# Patient Record
Sex: FEMALE | Race: WHITE | Employment: STUDENT | URBAN - METROPOLITAN AREA
[De-identification: names, ages, dates, MRNs, and addresses within clinical notes are randomized per-mention and may not be internally consistent; named-entity substitution may affect disease eponyms.]

---

## 2019-04-05 ENCOUNTER — TELEPHONE (OUTPATIENT)
Dept: DERMATOLOGY | Facility: CLINIC | Age: 19
End: 2019-04-05

## 2019-04-05 NOTE — TELEPHONE ENCOUNTER
Dermatology Pre-visit Call:    Reason for visit : Acne        Patient Reminders Given:  --Please, make sure you bring an updated list of your medications.   --Plan on being in our facility for approximately one hour, this includes the registration process, office visit, education and check-out process.  If you are having a procedure, more time may be required.     --If you are having a procedure, please, present 15 minutes early.  --Location reviewed.   --If you need to cancel or reschedule, call XXXX  --We look forward to seeing you in Dermatology Clinic.

## 2019-04-13 ENCOUNTER — OFFICE VISIT (OUTPATIENT)
Dept: DERMATOLOGY | Facility: CLINIC | Age: 19
End: 2019-04-13
Payer: COMMERCIAL

## 2019-04-13 VITALS — DIASTOLIC BLOOD PRESSURE: 58 MMHG | SYSTOLIC BLOOD PRESSURE: 103 MMHG

## 2019-04-13 DIAGNOSIS — L70.0 ACNE VULGARIS: Primary | ICD-10-CM

## 2019-04-13 RX ORDER — SPIRONOLACTONE 25 MG/1
50 TABLET ORAL 2 TIMES DAILY
Qty: 60 TABLET | Refills: 3 | Status: SHIPPED | OUTPATIENT
Start: 2019-04-13 | End: 2019-04-13

## 2019-04-13 RX ORDER — SPIRONOLACTONE 25 MG/1
50 TABLET ORAL 2 TIMES DAILY
Qty: 60 TABLET | Refills: 3 | Status: SHIPPED | OUTPATIENT
Start: 2019-04-13 | End: 2019-05-16 | Stop reason: DRUGHIGH

## 2019-04-13 RX ORDER — ADAPALENE 0.1 G/100G
CREAM TOPICAL AT BEDTIME
COMMUNITY
End: 2020-01-20

## 2019-04-13 RX ORDER — DOXYCYCLINE 100 MG/1
100 CAPSULE ORAL 2 TIMES DAILY
Qty: 60 CAPSULE | Refills: 3 | Status: SHIPPED | OUTPATIENT
Start: 2019-04-13 | End: 2019-05-16

## 2019-04-13 RX ORDER — SULFACETAMIDE SODIUM 100 MG/ML
LOTION TOPICAL
COMMUNITY
End: 2020-01-20

## 2019-04-13 ASSESSMENT — PAIN SCALES - GENERAL: PAINLEVEL: MILD PAIN (2)

## 2019-04-13 NOTE — LETTER
4/13/2019       RE: Massiel Salvador  [facility]  [address]  [facility] [address]     Dear Colleague,    Thank you for referring your patient, Massiel Salvador, to the [facility] DERMATOLOGY at [facility]. Please see a copy of my visit note below.    [facility] Dermatology Note      Dermatology Problem List:  1. Acne Vulgaris - Start doxycyline 100 mg po bid, spironolactone 50mg po BID, Continue adapalene 0.1% cream and sulfacetamide sodium 10% lotion    Encounter Date: Apr 13, 2019    CC:  Chief Complaint   Patient presents with     Acne     Massiel is here today to be seen for acne.          History of Present Illness:  Ms. Massiel Salvador is a 18 year old female who is new to the dermatology clinic present for an evaluation for acne vulgaris. Patient has been dealing with her acne her whole life. It comes in waves of good and bad. These past couple months she noticed her her acne flare up. She mostly had acne on her right cheek and chin. But now is on both sided and is painful. She does not get body acne.  She has seen a dermatologist in the past and was put on adapalene 0.1% cream and sulfacetamide sodium 10% lotion. Patient's form of contraception is Mirena IUD, and was placed in August. She does get irregular menstrual periods or no menstrual period due to Mirena. She tired combined and mini pill as her OCP in the past but she noticed side effects while on the medication and thus wants to avoid them.  Has tried doxycyline and minocycline in the past, but did notice nausea. Patient does not have a family history of breast cancer, except for paternal grandmother. The patient denies painful, itching, tingling or bleeding lesions unless otherwise noted.    Past Medical History:   There is no problem list on file for this patient.    History reviewed. No pertinent past medical history.  History reviewed. No pertinent surgical history.    Social History:   reports  that she has never smoked. She has never used smokeless tobacco.   - student studying international business at the [employer]     Family History:  Family History   Problem Relation Age of Onset     Melanoma No family hx of      Skin Cancer No family hx of        Medications:  Current Outpatient Medications   Medication Sig Dispense Refill     adapalene (DIFFERIN) 0.1 % external cream Apply topically At Bedtime       sulfacetamide sodium, Acne, 10 % lotion          No Known Allergies    Review of Systems:  -Constitutional: The patient denies fatigue, fevers, chills, unintended weight loss, and night sweats.  -HEENT: Patient denies nonhealing oral sores.  -Skin: As above in HPI. No additional skin concerns.  -GI: The patient denies nausea, vomiting, diarrhea or abdominal pain.  -: No changes in menstrual cycle, no breast tenderness, no change in urination  -CVS: no palpitations, dizziness    Physical exam:  Vitals: There were no vitals taken for this visit.  GEN: This is a well developed, well-nourished female in no acute distress, in a pleasant mood.    SKIN: Acne exam, which includes the face, neck, upper central chest, and upper central back was performed.  -There are superifical acneiform papules with intermixed open and closed comedones on the bilateral cheeks and chin in a hormonal distribution.   -No other lesions of concern on areas examined.       Impression/Plan:  1. Acne vulgaris    Educatd on the etiology     Start doxycyline 100 mg po bid, will plan to discontinue this at next visit in the hope that her spironolactone will be having a positive effect    Start spironolactone 50mg po BID for a total of 100mg daily  Side effects of spironolactone discussed including postural hypotension, increased frequency of urination, breast tenderness, menstrual spotting, cardiac arrythmias and the need for contraception due to fetal feminization.    Baseline blood pressure obtained. Discussed with patient that medication  will need to be stopped if pregnancy is desired.   Start Spironolactone 50mg daily which may be increased to twice daily after one week as tolerated.    There is no family history or personal history of breast malignancy known to patient.    Continue adapalene 0.1% cream    Continue sulfacetamide sodium 10% lotion    Sun precaution was advised including the use of sun screens of SPF 30 or higher, sun protective clothing, and avoidance of tanning beds.    CC Dr. Caceres on close of this encounter.  Follow-up in 4 months, earlier for new or changing lesions.       Staff Involved:  Staff/Scribe    Scribe Disclosure:  I, Ellen Parrish, am serving as a scribe to document services personally performed by Ryann Weber PA-C, based on data collection and the provider's statements to me.     Provider Disclosure:   The documentation recorded by the scribe accurately reflects the services I personally performed and the decisions made by me.    All risks, benefits and alternatives were discussed with patient.  Patient is in agreement and understands the assessment and plan.  All questions were answered.    Ryann Weber PA-C

## 2019-04-13 NOTE — PROGRESS NOTES
[facility] Dermatology Note      Dermatology Problem List:  1. Acne Vulgaris - Start doxycyline 100 mg po bid, spironolactone 50mg po BID, Continue adapalene 0.1% cream and sulfacetamide sodium 10% lotion    Encounter Date: Apr 13, 2019    CC:  Chief Complaint   Patient presents with     Acne     Massiel is here today to be seen for acne.          History of Present Illness:  Ms. Massiel Salvador is a 18 year old female who is new to the dermatology clinic present for an evaluation for acne vulgaris. Patient has been dealing with her acne her whole life. It comes in waves of good and bad. These past couple months she noticed her her acne flare up. She mostly had acne on her right cheek and chin. But now is on both sided and is painful. She does not get body acne.  She has seen a dermatologist in the past and was put on adapalene 0.1% cream and sulfacetamide sodium 10% lotion. Patient's form of contraception is Mirena IUD, and was placed in August. She does get irregular menstrual periods or no menstrual period due to Mirena. She tired combined and mini pill as her OCP in the past but she noticed side effects while on the medication and thus wants to avoid them.  Has tried doxycyline and minocycline in the past, but did notice nausea. Patient does not have a family history of breast cancer, except for paternal grandmother. The patient denies painful, itching, tingling or bleeding lesions unless otherwise noted.    Past Medical History:   There is no problem list on file for this patient.    History reviewed. No pertinent past medical history.  History reviewed. No pertinent surgical history.    Social History:   reports that she has never smoked. She has never used smokeless tobacco.   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  -HEENT: Patient denies nonhealing oral sores.  -Skin: As above in HPI. No additional skin concerns.  -GI: The patient denies nausea, vomiting, diarrhea or abdominal pain.  -: No changes in menstrual cycle, no breast tenderness, no change in urination  -CVS: no palpitations, dizziness    Physical exam:  Vitals: There were no vitals taken for this visit.  GEN: This is a well developed, well-nourished female in no acute distress, in a pleasant mood.    SKIN: Acne exam, which includes the face, neck, upper central chest, and upper central back was performed.  -There are superifical acneiform papules with intermixed open and closed comedones on the bilateral cheeks and chin in a hormonal distribution.   -No other lesions of concern on areas examined.       Impression/Plan:  1. Acne vulgaris    Educatd on the etiology     Start doxycyline 100 mg po bid, will plan to discontinue this at next visit in the hope that her spironolactone will be having a positive effect    Start spironolactone 50mg po BID for a total of 100mg daily  Side effects of spironolactone discussed including postural hypotension, increased frequency of urination, breast tenderness, menstrual spotting, cardiac arrythmias and the need for contraception due to fetal feminization.    Baseline blood pressure obtained. Discussed with patient that medication will need to be stopped if pregnancy is desired.   Start Spironolactone 50mg daily which may be increased to twice daily after one week as tolerated.    There is no family history or personal history of breast malignancy known to patient.    Continue adapalene 0.1%  cream    Continue sulfacetamide sodium 10% lotion    Sun precaution was advised including the use of sun screens of SPF 30 or higher, sun protective clothing, and avoidance of tanning beds.    CC Dr. Caceres on close of this encounter.  Follow-up in 4 months, earlier for new or changing lesions.       Staff Involved:  Staff/Scribe    Scribe Disclosure:  I, Ellen Parrish, am serving as a scribe to document services personally performed by Ryann Weber PA-C, based on data collection and the provider's statements to me.     Provider Disclosure:   The documentation recorded by the scribe accurately reflects the services I personally performed and the decisions made by me.    All risks, benefits and alternatives were discussed with patient.  Patient is in agreement and understands the assessment and plan.  All questions were answered.    Ryann Weber PA-C  [facility]: Phone: [phone], Fax: [phone]

## 2019-04-13 NOTE — NURSING NOTE
Dermatology Rooming Note    Massiel Salvador's goals for this visit include:   Chief Complaint   Patient presents with     Acne     Massiel is here today to be seen for acne.      KATHRINE Majano

## 2019-04-13 NOTE — PATIENT INSTRUCTIONS
Doxycycline     1.Doxycycline treats and prevents infections and may be used to treat acne.   2.Do not use it if you had an allergic reaction to doxycycline or another tetracycline antibiotic, or if you are pregnant or breastfeeding.  3. If you miss a dose, take a dose as soon as you remember. If it is almost time for your next dose, wait until then and take a regular dose. Do not take extra medicine to make up for a missed dose.   4 . Some foods and medicines can affect the medication. Tell your doctor if you are using any of the following: bismuth subsalicylate, isotretinoin, acitretin, medications that contain aluminum, calcium, or iron (such an antacid or vitamin supplement)  5. This medicine may cause birth defects if being used during pregnancy.  Use two forms of birth control to keep from getting pregnant.   6. Tell your doctor if you had stomach surgery or if you have a history of yeast infections.   7. This medicine may cause the following problems (stop the medication if you experience these symptoms and call your doctor):  Permanent change in tooth color (in children younger than 8 years old)   Increased pressure inside the head   Yeast infection   Diarrhea    This medicine may make your skin more sun sensitive and increase sun burns. Wear sunscreen and do not tan.     Allergic reaction with itching, hives, facial swelling, throat swelling, chest tightness, trouble breathing     Blistering, peeling, red skin rash     Burning, pain, or irritation in your upper stomach or throat     Joint pain, fever, rash, and unusual tiredness or weakness     Severe headache, dizziness, or vision changes  8. Call your doctor if your symptoms worsen or do not improve  Who should I call with questions?    [facility]: [phone]     [facility]: [phone]    For urgent needs outside of business hours call the [facility] at [phone] and ask for the  dermatology resident on call

## 2019-05-16 ENCOUNTER — TELEPHONE (OUTPATIENT)
Dept: DERMATOLOGY | Facility: CLINIC | Age: 19
End: 2019-05-16

## 2019-05-16 DIAGNOSIS — L70.0 ACNE VULGARIS: ICD-10-CM

## 2019-05-16 RX ORDER — SPIRONOLACTONE 50 MG/1
50 TABLET, FILM COATED ORAL 2 TIMES DAILY
Qty: 180 TABLET | Refills: 0 | Status: SHIPPED | OUTPATIENT
Start: 2019-05-16 | End: 2019-10-15

## 2019-05-16 RX ORDER — DOXYCYCLINE 100 MG/1
100 CAPSULE ORAL 2 TIMES DAILY
Qty: 180 CAPSULE | Refills: 0 | Status: SHIPPED | OUTPATIENT
Start: 2019-05-16 | End: 2019-08-09 | Stop reason: SINTOL

## 2019-05-16 NOTE — TELEPHONE ENCOUNTER
[facility]    Phone Message    May a detailed message be left on voicemail: yes    Reason for Call: Medication Question or concern regarding medication   Prescription Clarification  Name of Medication: Doxycycline, and Spironolactone  Prescribing Provider: FATOU   Pharmacy: [facility], [address] [place] [address]  PH#: [phone]   What on the order needs clarification? Pt will be leaving the country for 3 months THIS SUNDAY. Pt is requesting for these 2 rx to be sent to the pharmacy asap.     Action Taken: Message routed to:  [facility] ([facility] derm

## 2019-05-16 NOTE — TELEPHONE ENCOUNTER
Received message that pt needed refill of doxy and spironolactone as she will be leaving country for 3 months. Reviewed noted from 4/13/19 visit with Ryann Weber and based on that note, sent Rx for 90-day supply of doxycycline 100mg BID and spironolactone 50mg BID.     Attempted to call patient to ask about which pharmacy she would like it sent to as previous prescriptions sent to [facility] in [facility] and this request had [facility] in [place] tagged. Was unable to reach her, so sent prescriptions electronically to [facility].     Nickolas Samuels MD  PGY4 Dermatology Resident  Pager: [phone]

## 2019-08-09 ENCOUNTER — OFFICE VISIT (OUTPATIENT)
Dept: DERMATOLOGY | Facility: CLINIC | Age: 19
End: 2019-08-09
Payer: COMMERCIAL

## 2019-08-09 VITALS — SYSTOLIC BLOOD PRESSURE: 106 MMHG | DIASTOLIC BLOOD PRESSURE: 67 MMHG

## 2019-08-09 DIAGNOSIS — L70.0 ACNE VULGARIS: ICD-10-CM

## 2019-08-09 DIAGNOSIS — L81.0 POST-INFLAMMATORY HYPERPIGMENTATION: ICD-10-CM

## 2019-08-09 DIAGNOSIS — Z79.899 ON ISOTRETINOIN THERAPY: Primary | ICD-10-CM

## 2019-08-09 ASSESSMENT — PAIN SCALES - GENERAL: PAINLEVEL: NO PAIN (0)

## 2019-08-09 NOTE — LETTER
"8/9/2019       RE: Massiel Salvador  [address]  [place]  [place] [address]     Dear Colleague,    Thank you for referring your patient, Massiel Salvador, to the [facility] DERMATOLOGY at [facility]. Please see a copy of my visit note below.    [facility] Dermatology Note      Dermatology Problem List:  1. Acne Vulgaris - Plan to start isotretinoin pending approval from her plastic surgeon, will obtain first urine pregnancy test no 8/26/19 if isotretinoin is approved. May also continue on spironolactone concurrently.  -Current tx: spironolactone 50mg po BID, Continue adapalene 0.1% cream and sulfacetamide sodium 10% lotion  -Previous tx: doxycycline 100mg po BID    Encounter Date: Aug 9, 2019    CC:  Chief Complaint   Patient presents with     Acne     Massiel is here today for an acne follow up- Massiel states \"it's better\".          History of Present Illness:  Ms. Massiel Salvador is a 18 year old female who presents as a follow-up for acne. The patient was last seen on 04/13/2019 when doxycycline 100mg po BID and spironolactone 50mg po BID was started for acne vulgaris and adapalene 0.1% cream and sulfacetamide sodium 10% lotion was continued for acne vulgaris.    At today's visit, the patient notes her acne is improved overall. She states that doxycycline is upsetting her stomach, even with probiotics and that she takes spironolactone at night due to dizziness. She states she would like to discuss accutane. According to the patient her acne is cyclical, and that her skin will get worse in a couple months. She is currently on an IUD. The patient notes a history of anxiety and depression that she has dealt with her whole life. She visits a therapist regularly and has a \"good support system\" and takes hydroxyzine as needed, but is otherwise stable and does not really use oral medication for it. The patient requests to stop doxycycline due to her upset stomach. She states she " will be getting a rhinoplasty next week and notes the healing time will be six weeks. Denies menstrual irregularities, breast tenderness, or excessive urination.     Past Medical History:   There is no problem list on file for this patient.    No past medical history on file.  No past surgical history on file.    Social History:   reports that she has never smoked. She has never used smokeless tobacco.    Family History:  Family History   Problem Relation Age of Onset     Melanoma No family hx of      Skin Cancer No family hx of        Medications:  Current Outpatient Medications   Medication Sig Dispense Refill     adapalene (DIFFERIN) 0.1 % external cream Apply topically At Bedtime       doxycycline monohydrate (MONODOX) 100 MG capsule Take 1 capsule (100 mg) by mouth 2 times daily 180 capsule 0     spironolactone (ALDACTONE) 50 MG tablet Take 1 tablet (50 mg) by mouth 2 times daily 180 tablet 0     sulfacetamide sodium, Acne, 10 % lotion          No Known Allergies    Review of Systems:  -GI: The patient denies nausea, vomiting, diarrhea or abdominal pain.  -: No changes in menstrual cycle, no breast tenderness, no change in urination  -CVS: no palpitations, dizziness  -Constitutional: The patient denies fatigue, fevers, chills, unintended weight loss, and night sweats.  -HEENT: Patient denies nonhealing oral sores.  -Skin: As above in HPI. No additional skin concerns.    Physical exam:  Vitals: /67 (BP Location: Right arm, Patient Position: Sitting, Cuff Size: Adult Regular)   GEN: This is a well developed, well-nourished female in no acute distress, in a pleasant mood.    SKIN: Acne exam, which includes the face, neck, upper central chest, and upper central back was performed.  -There are superifical acneiform papules with intermixed open and closed comedones on the cheeks and chin.   -Moderate improvement from last visit  -Scattered erythematous macules on the lower face  -No other lesions of concern  on areas examined.       Impression/Plan:  1. Acne vulgaris with PIH  Will start isotretinoin 40mg in approximately 7 weeks pending approval from her plastic surgeon. Goal dose is 150-220mg/kg for this 65.90kg patient.   If isotretinoin is not approved by the patient's surgeon, will start isotretinoin in approximately 3 months post surgery   Method of contraception includes: IUD and male condoms  Discussion of the risks and side effects of isotretinoin including but not limited to mucocutaneous dryness, arthralgias, myalgias, depression, suicidal ideation, headache, blurred vision, increase in liver function test and increase in lipids. The iPledge program brochure was provided and the contents discussed with the patient. The patient was counseled that they cannot give blood while on isotretinoin. Advised against tattoos and waxing. No personal or family history of inflammatory bowel disease or hypertriglyceridemia known to patient. Reviewed need to avoid alcohol on medication. The iPledge program consent was obtained. Patient counseled that if they wear contacts, the eyes may become too dry to tolerate. Recommend follow up with eye doctor if this occurs.    Discussed need for sun protection, at least SPF 30+.  Urine pregnancy test to be obtained on August 26th.   Contraception will include: IUD and male latex condoms.  Next month: baseline labs including qualitative hCG, CBC, GGT, BUN/Cr, fasting lipids and AST/ALT will be obtained.   Patient to be registered with iPledge on August 26th Patient's iPledge # is [id number].   The patient will stop all other acne medications in approximately 7 weeks. Will discontinue doxycycline now as patient was getting some  side effects.  Total dose: 0mg/kg  Will consider continuing spironolactone over the course of isotretinoin treatment       CC Dr. Caceres on close of this encounter.  Follow-up in 7 weeks, earlier for new or changing lesions.       Staff  Involved:  Staff/Scribe    Scribe Disclosure:  I, Eron Alvarez, am serving as a scribe to document services personally performed by Ryann Weber PA-C, based on data collection and the provider's statements to me.     Provider Disclosure:   The documentation recorded by the scribe accurately reflects the services I personally performed and the decisions made by me.    All risks, benefits and alternatives were discussed with patient.  Patient is in agreement and understands the assessment and plan.  All questions were answered.    Ryann Weber PA-C  [facility]: Phone: [phone], Fax: [phone]

## 2019-08-09 NOTE — NURSING NOTE
"Dermatology Rooming Note    Massiel Salvador's goals for this visit include:   Chief Complaint   Patient presents with     Acne     Massiel is here today for an acne follow up- Massiel states \"it's better\".      Zaida Cosby, KATHRINE     "

## 2019-08-09 NOTE — PROGRESS NOTES
"[facility] Dermatology Note      Dermatology Problem List:  1. Acne Vulgaris - Plan to start isotretinoin pending approval from her plastic surgeon, will obtain first urine pregnancy test no 8/26/19 if isotretinoin is approved. May also continue on spironolactone concurrently.  -Current tx: spironolactone 50mg po BID, Continue adapalene 0.1% cream and sulfacetamide sodium 10% lotion  -Previous tx: doxycycline 100mg po BID    Encounter Date: Aug 9, 2019    CC:  Chief Complaint   Patient presents with     Acne     Massiel is here today for an acne follow up- Massiel states \"it's better\".          History of Present Illness:  Ms. Massiel Salvador is a 18 year old female who presents as a follow-up for acne. The patient was last seen on 04/13/2019 when doxycycline 100mg po BID and spironolactone 50mg po BID was started for acne vulgaris and adapalene 0.1% cream and sulfacetamide sodium 10% lotion was continued for acne vulgaris.    At today's visit, the patient notes her acne is improved overall. She states that doxycycline is upsetting her stomach, even with probiotics and that she takes spironolactone at night due to dizziness. She states she would like to discuss accutane. According to the patient her acne is cyclical, and that her skin will get worse in a couple months. She is currently on an IUD. The patient notes a history of anxiety and depression that she has dealt with her whole life. She visits a therapist regularly and has a \"good support system\" and takes hydroxyzine as needed, but is otherwise stable and does not really use oral medication for it. The patient requests to stop doxycycline due to her upset stomach. She states she will be getting a rhinoplasty next week and notes the healing time will be six weeks. Denies menstrual irregularities, breast tenderness, or excessive urination.     Past Medical History:   There is no problem list on file for this patient.    No past medical history on " file.  No past surgical history on file.    Social History:   reports that she has never smoked. She has never used smokeless tobacco.    Family History:  Family History   Problem Relation Age of Onset     Melanoma No family hx of      Skin Cancer No family hx of        Medications:  Current Outpatient Medications   Medication Sig Dispense Refill     adapalene (DIFFERIN) 0.1 % external cream Apply topically At Bedtime       doxycycline monohydrate (MONODOX) 100 MG capsule Take 1 capsule (100 mg) by mouth 2 times daily 180 capsule 0     spironolactone (ALDACTONE) 50 MG tablet Take 1 tablet (50 mg) by mouth 2 times daily 180 tablet 0     sulfacetamide sodium, Acne, 10 % lotion          No Known Allergies    Review of Systems:  -GI: The patient denies nausea, vomiting, diarrhea or abdominal pain.  -: No changes in menstrual cycle, no breast tenderness, no change in urination  -CVS: no palpitations, dizziness  -Constitutional: The patient denies fatigue, fevers, chills, unintended weight loss, and night sweats.  -HEENT: Patient denies nonhealing oral sores.  -Skin: As above in HPI. No additional skin concerns.    Physical exam:  Vitals: /67 (BP Location: Right arm, Patient Position: Sitting, Cuff Size: Adult Regular)   GEN: This is a well developed, well-nourished female in no acute distress, in a pleasant mood.    SKIN: Acne exam, which includes the face, neck, upper central chest, and upper central back was performed.  -There are superifical acneiform papules with intermixed open and closed comedones on the cheeks and chin.   -Moderate improvement from last visit  -Scattered erythematous macules on the lower face  -No other lesions of concern on areas examined.       Impression/Plan:  1. Acne vulgaris with PIH  Will start isotretinoin 40mg in approximately 7 weeks pending approval from her plastic surgeon. Goal dose is 150-220mg/kg for this 65.90kg patient.   If isotretinoin is not approved by the patient's  surgeon, will start isotretinoin in approximately 3 months post surgery   Method of contraception includes: IUD and male condoms  Discussion of the risks and side effects of isotretinoin including but not limited to mucocutaneous dryness, arthralgias, myalgias, depression, suicidal ideation, headache, blurred vision, increase in liver function test and increase in lipids. The iPledge program brochure was provided and the contents discussed with the patient. The patient was counseled that they cannot give blood while on isotretinoin. Advised against tattoos and waxing. No personal or family history of inflammatory bowel disease or hypertriglyceridemia known to patient. Reviewed need to avoid alcohol on medication. The iPledge program consent was obtained. Patient counseled that if they wear contacts, the eyes may become too dry to tolerate. Recommend follow up with eye doctor if this occurs.    Discussed need for sun protection, at least SPF 30+.  Urine pregnancy test to be obtained on August 26th.   Contraception will include: IUD and male latex condoms.  Next month: baseline labs including qualitative hCG, CBC, GGT, BUN/Cr, fasting lipids and AST/ALT will be obtained.   Patient to be registered with iPledge on August 26th Patient's iPledge # is [id number].   The patient will stop all other acne medications in approximately 7 weeks. Will discontinue doxycycline now as patient was getting some  side effects.  Total dose: 0mg/kg  Will consider continuing spironolactone over the course of isotretinoin treatment       CC Dr. Caceres on close of this encounter.  Follow-up in 7 weeks, earlier for new or changing lesions.       Staff Involved:  Staff/Scribe    Scribe Disclosure:  Eron DUARTE, am serving as a scribe to document services personally performed by Ryann Weber PA-C, based on data collection and the provider's statements to me.     Provider Disclosure:   The documentation recorded by the scribe accurately  reflects the services I personally performed and the decisions made by me.    All risks, benefits and alternatives were discussed with patient.  Patient is in agreement and understands the assessment and plan.  All questions were answered.    Ryann Weber PA-C  [facility]: Phone: [phone], Fax: [phone]

## 2019-08-27 ENCOUNTER — TELEPHONE (OUTPATIENT)
Dept: DERMATOLOGY | Facility: CLINIC | Age: 19
End: 2019-08-27

## 2019-08-27 NOTE — TELEPHONE ENCOUNTER
Called pt to see if she had her pregnancy test done for accutane. Massiel states that she has not had a chance to talk with her primary care provider yet and will call us to let us know when she will be coming back to have her test done.     Zaiad Cosby, A

## 2019-10-03 ENCOUNTER — TELEPHONE (OUTPATIENT)
Dept: DERMATOLOGY | Facility: CLINIC | Age: 19
End: 2019-10-03

## 2019-10-03 NOTE — TELEPHONE ENCOUNTER
Massiel called to get a refill on her Spironolactone but had no additional refills after looking further into the chart it looks like you were going to consider continuing spironolactone over the course of isotretinoin treatment but since she hasn't started Accutane yet I wasn't sure. Massiel would like a appointment to discuss further treatment

## 2019-10-07 NOTE — TELEPHONE ENCOUNTER
Called pt and LVM. I wanted to schedule her a follow up appointment with Ryann.  KATHRINE Majano

## 2019-10-10 NOTE — TELEPHONE ENCOUNTER
[facility] Call Center    Phone Message    May a detailed message be left on voicemail: yes    Reason for Call: Symptoms or Concerns     If patient has red-flag symptoms, warm transfer to triage line    Current symptom or concern: Patient returning a call to Zaida. She requested to schedule an appointment with provider and first available is 11/05. Patient states she doesn't need an appointment and would just like a refill on medication. Please advise.     Action Taken: Message routed to:  [facility] Dermatology

## 2019-10-10 NOTE — TELEPHONE ENCOUNTER
Called pt to offer appointment in order to refill medication, however pt wanted to know if her prescription could be filled before she is seen. I told her that per Ryann Weber's last note, the pt needs to follow up in 7 weeks, however I will send her a message to check. Pt understood and declined appointment scheduling at the time. No other concerns.

## 2019-10-15 DIAGNOSIS — L70.0 ACNE VULGARIS: ICD-10-CM

## 2019-10-15 RX ORDER — SPIRONOLACTONE 50 MG/1
50 TABLET, FILM COATED ORAL 2 TIMES DAILY
Qty: 180 TABLET | Refills: 0 | Status: SHIPPED | OUTPATIENT
Start: 2019-10-15 | End: 2019-12-19

## 2019-10-15 NOTE — TELEPHONE ENCOUNTER
Called pt and LVM. I wanted to let her know message below. Clinic number provided.   KATHRINE Majano

## 2019-12-19 ENCOUNTER — OFFICE VISIT (OUTPATIENT)
Dept: DERMATOLOGY | Facility: CLINIC | Age: 19
End: 2019-12-19
Payer: COMMERCIAL

## 2019-12-19 ENCOUNTER — APPOINTMENT (OUTPATIENT)
Dept: LAB | Facility: CLINIC | Age: 19
End: 2019-12-19
Payer: COMMERCIAL

## 2019-12-19 DIAGNOSIS — Z79.899 ON ISOTRETINOIN THERAPY: Primary | ICD-10-CM

## 2019-12-19 DIAGNOSIS — L70.0 ACNE VULGARIS: ICD-10-CM

## 2019-12-19 LAB — HCG UR QL: NEGATIVE

## 2019-12-19 RX ORDER — SPIRONOLACTONE 50 MG/1
50 TABLET, FILM COATED ORAL 2 TIMES DAILY
Qty: 60 TABLET | Refills: 0 | Status: SHIPPED | OUTPATIENT
Start: 2019-12-19 | End: 2020-01-20

## 2019-12-19 ASSESSMENT — PAIN SCALES - GENERAL: PAINLEVEL: NO PAIN (0)

## 2019-12-19 NOTE — NURSING NOTE
Dermatology Rooming Note    Massiel Salvador's goals for this visit include:   Chief Complaint   Patient presents with     Acne     Massiel is here today for an acne follow up.      KATHRINE Majano

## 2019-12-19 NOTE — PROGRESS NOTES
[facility] Dermatology Note      Dermatology Problem List:  1. Acne Vulgaris - Plan to start isotretinoin 40 mg every day next month. May also continue on spironolactone concurrently. Start of month #0.  -Current tx: spironolactone 50mg po BID, Continue adapalene 0.1% cream and sulfacetamide sodium 10% lotion  -Previous tx: doxycycline 100mg po BID    Encounter Date: Dec 19, 2019    CC:  Chief Complaint   Patient presents with     Acne     Massiel is here today for an acne follow up.          History of Present Illness:  Ms. Massiel Salvador is a 19 year old female who presents as a follow-up for acne. The patient was last seen on 08/09/19 when it was planned to start isotretinoin 40 mg in 7 weeks pending approval from her plastic surgeon. At today's visit, the patient notes that her plastic surgery went well and her rhinoplasty was completed 4 months ago, but she has not spoken to her surgeon about starting accutane. The patient notes that she is using spironolactone and adapalene occassionally. She states she has less painful acne, but is still breaking out. She states she is not experiencing significant breast tenderness except during exercise. She notes that she does not get regular periods because she has an IUD. Given the option, the patient states she would like to stay on spironolactone while on accutane. Denies menstrual irregularities, breast tenderness, or excessive urination. No hx depression/anxiety.    Past Medical History:   There is no problem list on file for this patient.    No past medical history on file.  No past surgical history on file.    Social History:   reports that she has never smoked. She has never used smokeless tobacco.    Family History:  Family History   Problem Relation Age of Onset     Melanoma No family hx of      Skin Cancer No family hx of        Medications:  Current Outpatient Medications   Medication Sig Dispense Refill     adapalene (DIFFERIN) 0.1 % external  cream Apply topically At Bedtime       spironolactone (ALDACTONE) 50 MG tablet Take 1 tablet (50 mg) by mouth 2 times daily 180 tablet 0     sulfacetamide sodium, Acne, 10 % lotion          No Known Allergies    Review of Systems:  -GI: The patient denies nausea, vomiting, diarrhea or abdominal pain.  -: No changes in menstrual cycle, no breast tenderness, no change in urination  -CVS: no palpitations, dizziness  -Constitutional: The patient denies fatigue, fevers, chills, unintended weight loss, and night sweats.  -HEENT: Patient denies nonhealing oral sores.  -Skin: As above in HPI. No additional skin concerns.    Physical exam:  Vitals: There were no vitals taken for this visit.  GEN: This is a well developed, well-nourished female in no acute distress, in a pleasant mood.    SKIN: Acne exam, which includes the face, neck, upper central chest, and upper central back was performed.  -Hyperpigmented macules on the lower face  -Active papules on the chin  -No other lesions of concern on areas examined.       Impression/Plan:  1. Acne vulgaris with PIH    Will start isotretinoin 40mg next month. Goal dose is 150-220mg/kg for this 65.90kg patient.     Method of contraception includes: IUD and male condoms    Discussion of the risks and side effects of isotretinoin including but not limited to mucocutaneous dryness, arthralgias, myalgias, depression, suicidal ideation, headache, blurred vision, increase in liver function test and increase in lipids. The iPledge program brochure was provided and the contents discussed with the patient. The patient was counseled that they cannot give blood while on isotretinoin. Advised against tattoos and waxing. No personal or family history of inflammatory bowel disease or hypertriglyceridemia known to patient. Reviewed need to avoid alcohol on medication. The iPledge program consent was obtained. Patient counseled that if they wear contacts, the eyes may become too dry to tolerate.  Recommend follow up with eye doctor if this occurs.      Discussed need for sun protection, at least SPF 30+.    Urine pregnancy test obtained today.     Contraception will include: IUD and male latex condoms.    Next month: baseline labs including qualitative hCG, CBC, GGT, BUN/Cr, fasting lipids and AST/ALT will be obtained.     iPledge # is [id number].     Patient will discontinue all other medications in 1 month except spironolactone - will continue this while on isotretinoin.     Total dose: 0mg/kg     CC Dr. Caceres on close of this encounter.  Follow-up in 1 month, earlier for new or changing lesions.       Staff Involved:  Staff/Scribe    Scribe Disclosure:  I, Eron Alvarez, am serving as a scribe to document services personally performed by Ryann Weber PA-C, based on data collection and the provider's statements to me.     Provider Disclosure:   The documentation recorded by the scribe accurately reflects the services I personally performed and the decisions made by me.    All risks, benefits and alternatives were discussed with patient.  Patient is in agreement and understands the assessment and plan.  All questions were answered.    Ryann Weber PA-C, MPAS  [facility] [place]: Phone: [phone], Fax: [phone]  [facility]: Phone: [phone],  Fax: [phone]

## 2019-12-19 NOTE — LETTER
12/19/2019       RE: Massiel Salvador  [address]  [place] [address]     Dear Colleague,    Thank you for referring your patient, Massiel Salvador, to the [facility] DERMATOLOGY at [facility]. Please see a copy of my visit note below.    [facility] Dermatology Note      Dermatology Problem List:  1. Acne Vulgaris - Plan to start isotretinoin 40 mg every day next month. May also continue on spironolactone concurrently. Start of month #0.  -Current tx: spironolactone 50mg po BID, Continue adapalene 0.1% cream and sulfacetamide sodium 10% lotion  -Previous tx: doxycycline 100mg po BID    Encounter Date: Dec 19, 2019    CC:  Chief Complaint   Patient presents with     Acne     Massiel is here today for an acne follow up.          History of Present Illness:  Ms. Massiel Salvador is a 19 year old female who presents as a follow-up for acne. The patient was last seen on 08/09/19 when it was planned to start isotretinoin 40 mg in 7 weeks pending approval from her plastic surgeon. At today's visit, the patient notes that her plastic surgery went well and her rhinoplasty was completed 4 months ago, but she has not spoken to her surgeon about starting accutane. The patient notes that she is using spironolactone and adapalene occassionally. She states she has less painful acne, but is still breaking out. She states she is not experiencing significant breast tenderness except during exercise. She notes that she does not get regular periods because she has an IUD. Given the option, the patient states she would like to stay on spironolactone while on accutane. Denies menstrual irregularities, breast tenderness, or excessive urination. No hx depression/anxiety.    Past Medical History:   There is no problem list on file for this patient.    No past medical history on file.  No past surgical history on file.    Social History:   reports that she has never smoked. She has never used  smokeless tobacco.    Family History:  Family History   Problem Relation Age of Onset     Melanoma No family hx of      Skin Cancer No family hx of        Medications:  Current Outpatient Medications   Medication Sig Dispense Refill     adapalene (DIFFERIN) 0.1 % external cream Apply topically At Bedtime       spironolactone (ALDACTONE) 50 MG tablet Take 1 tablet (50 mg) by mouth 2 times daily 180 tablet 0     sulfacetamide sodium, Acne, 10 % lotion          No Known Allergies    Review of Systems:  -GI: The patient denies nausea, vomiting, diarrhea or abdominal pain.  -: No changes in menstrual cycle, no breast tenderness, no change in urination  -CVS: no palpitations, dizziness  -Constitutional: The patient denies fatigue, fevers, chills, unintended weight loss, and night sweats.  -HEENT: Patient denies nonhealing oral sores.  -Skin: As above in HPI. No additional skin concerns.    Physical exam:  Vitals: There were no vitals taken for this visit.  GEN: This is a well developed, well-nourished female in no acute distress, in a pleasant mood.    SKIN: Acne exam, which includes the face, neck, upper central chest, and upper central back was performed.  -Hyperpigmented macules on the lower face  -Active papules on the chin  -No other lesions of concern on areas examined.       Impression/Plan:  1. Acne vulgaris with PIH    Will start isotretinoin 40mg  next month. Goal dose is 150-220mg/kg for this 65.90kg patient.     Method of contraception includes: IUD and male condoms    Discussion of the risks and side effects of isotretinoin including but not limited to mucocutaneous dryness, arthralgias, myalgias, depression, suicidal ideation, headache, blurred vision, increase in liver function test and increase in lipids. The iPledge program brochure was provided and the contents discussed with the patient. The patient was counseled that they cannot give blood while on isotretinoin. Advised against tattoos and waxing. No  personal or family history of inflammatory bowel disease or hypertriglyceridemia known to patient. Reviewed need to avoid alcohol on medication. The [employer] program consent was obtained. Patient counseled that if they wear contacts, the eyes may become too dry to tolerate. Recommend follow up with eye doctor if this occurs.      Discussed need for sun protection, at least SPF 30+.    Urine pregnancy test  obtained today.     Contraception will include: IUD and male latex condoms.    Next month: baseline labs including qualitative hCG, CBC, GGT, BUN/Cr, fasting lipids and AST/ALT will be obtained.     iPledge # is [id number].     Patient will discontinue all other medications in 1 month except spironolactone - will continue this while on isotretinoin.     Total dose: 0mg/kg     CC Dr. Caceres on close of this encounter.  Follow-up in 1 month, earlier for new or changing lesions.       Staff Involved:  Staff/Scribe    Scribe Disclosure:  GERALD, Eron Alvarez, am serving as a scribe to document services personally performed by Ryann Weber PA-C, based on data collection and the provider's statements to me.     Provider Disclosure:   The documentation recorded by the scribe accurately reflects the services I personally performed and the decisions made by me.    All risks, benefits and alternatives were discussed with patient.  Patient is in agreement and understands the assessment and plan.  All questions were answered.    Ryann Weber PA-C, MPAS  [facility] [place]: Phone: [phone], Fax: [phone]  [facility]: Phone: [phone],  Fax: [phone]

## 2020-01-20 ENCOUNTER — OFFICE VISIT (OUTPATIENT)
Dept: DERMATOLOGY | Facility: CLINIC | Age: 20
End: 2020-01-20
Payer: COMMERCIAL

## 2020-01-20 VITALS — DIASTOLIC BLOOD PRESSURE: 68 MMHG | SYSTOLIC BLOOD PRESSURE: 115 MMHG

## 2020-01-20 DIAGNOSIS — Z79.899 ON ISOTRETINOIN THERAPY: ICD-10-CM

## 2020-01-20 DIAGNOSIS — L70.0 ACNE VULGARIS: Primary | ICD-10-CM

## 2020-01-20 LAB
ALBUMIN SERPL-MCNC: 3.6 G/DL (ref 3.4–5)
ALP SERPL-CCNC: 82 U/L (ref 40–150)
ALT SERPL W P-5'-P-CCNC: 40 U/L (ref 0–50)
ANION GAP SERPL CALCULATED.3IONS-SCNC: 3 MMOL/L (ref 3–14)
AST SERPL W P-5'-P-CCNC: 20 U/L (ref 0–45)
BASOPHILS # BLD AUTO: 0 10E9/L (ref 0–0.2)
BASOPHILS NFR BLD AUTO: 0.4 %
BILIRUB SERPL-MCNC: 0.5 MG/DL (ref 0.2–1.3)
BUN SERPL-MCNC: 20 MG/DL (ref 7–30)
CALCIUM SERPL-MCNC: 9.4 MG/DL (ref 8.5–10.1)
CHLORIDE SERPL-SCNC: 108 MMOL/L (ref 94–109)
CHOLEST SERPL-MCNC: 175 MG/DL
CO2 SERPL-SCNC: 28 MMOL/L (ref 20–32)
CREAT SERPL-MCNC: 0.78 MG/DL (ref 0.52–1.04)
DIFFERENTIAL METHOD BLD: NORMAL
EOSINOPHIL # BLD AUTO: 0.2 10E9/L (ref 0–0.7)
EOSINOPHIL NFR BLD AUTO: 2.2 %
ERYTHROCYTE [DISTWIDTH] IN BLOOD BY AUTOMATED COUNT: 11.9 % (ref 10–15)
GFR SERPL CREATININE-BSD FRML MDRD: >90 ML/MIN/{1.73_M2}
GLUCOSE SERPL-MCNC: 79 MG/DL (ref 70–99)
HCG UR QL: NEGATIVE
HCT VFR BLD AUTO: 46.1 % (ref 35–47)
HDLC SERPL-MCNC: 42 MG/DL
HGB BLD-MCNC: 15.7 G/DL (ref 11.7–15.7)
IMM GRANULOCYTES # BLD: 0.1 10E9/L (ref 0–0.4)
IMM GRANULOCYTES NFR BLD: 1 %
LDLC SERPL CALC-MCNC: 91 MG/DL
LYMPHOCYTES # BLD AUTO: 1.6 10E9/L (ref 0.8–5.3)
LYMPHOCYTES NFR BLD AUTO: 19.4 %
MCH RBC QN AUTO: 31.4 PG (ref 26.5–33)
MCHC RBC AUTO-ENTMCNC: 34.1 G/DL (ref 31.5–36.5)
MCV RBC AUTO: 92 FL (ref 78–100)
MONOCYTES # BLD AUTO: 0.6 10E9/L (ref 0–1.3)
MONOCYTES NFR BLD AUTO: 7.4 %
NEUTROPHILS # BLD AUTO: 5.8 10E9/L (ref 1.6–8.3)
NEUTROPHILS NFR BLD AUTO: 69.6 %
NONHDLC SERPL-MCNC: 133 MG/DL
NRBC # BLD AUTO: 0 10*3/UL
NRBC BLD AUTO-RTO: 0 /100
PLATELET # BLD AUTO: 264 10E9/L (ref 150–450)
POTASSIUM SERPL-SCNC: 3.8 MMOL/L (ref 3.4–5.3)
PROT SERPL-MCNC: 7.2 G/DL (ref 6.8–8.8)
RBC # BLD AUTO: 5 10E12/L (ref 3.8–5.2)
SODIUM SERPL-SCNC: 139 MMOL/L (ref 133–144)
TRIGL SERPL-MCNC: 213 MG/DL
WBC # BLD AUTO: 8.4 10E9/L (ref 4–11)

## 2020-01-20 RX ORDER — ISOTRETINOIN 40 MG/1
40 CAPSULE ORAL DAILY
Qty: 30 CAPSULE | Refills: 0 | Status: SHIPPED | OUTPATIENT
Start: 2020-01-20 | End: 2020-02-06 | Stop reason: SINTOL

## 2020-01-20 RX ORDER — SPIRONOLACTONE 50 MG/1
50 TABLET, FILM COATED ORAL 2 TIMES DAILY
Qty: 60 TABLET | Refills: 0 | Status: SHIPPED | OUTPATIENT
Start: 2020-01-20 | End: 2020-03-06

## 2020-01-20 ASSESSMENT — PAIN SCALES - GENERAL: PAINLEVEL: NO PAIN (0)

## 2020-01-20 NOTE — LETTER
1/20/2020       RE: Massiel Salvador  [address]  [place] [address]     Dear Colleague,    Thank you for referring your patient, Massiel Salvador, to the [facility] DERMATOLOGY at [facility]. Please see a copy of my visit note below.    [facility] Dermatology Note      Dermatology Problem List:  1. Acne Vulgaris -  On isotretinoin 40 mg every day, Start of month #1.  -Current tx: spironolactone 50mg po BID, isotretinoin 40 mg every day  -Previous tx: doxycycline 100mg po BID, adapalene 0.1% cream, sulfacetamide sodium 10% lotion    Encounter Date: Jan 20, 2020    CC:  Chief Complaint   Patient presents with     Accutane     Massiel is here today for an accutane follow up.          History of Present Illness:  Ms. Massiel Salvador is a 19 year old female who presents as a follow-up for acne. The patient was last seen on 12/19/2019 when it was planned to start isotretinoin 40 mg next month. At today's visit, the patient notes she is ready to start accutane. She states she accidentally took 100 mg spironolactone daily (instead of 50mg daily) for the last month and denies experiencing side effects. The patient denies additional areas of concern. She notes she was on an antibiotic for a sinus infection 1 week ago but is no longer taking the medication. The patient denies painful, itching, tingling or bleeding lesions unless otherwise noted. Denies menstrual irregularities, breast tenderness, or excessive urination.     Past Medical History:   There is no problem list on file for this patient.    No past medical history on file.  No past surgical history on file.    Social History:   reports that she has never smoked. She has never used smokeless tobacco.    Family History:  Family History   Problem Relation Age of Onset     Melanoma No family hx of      Skin Cancer No family hx of        Medications:  Current Outpatient Medications   Medication Sig Dispense Refill      adapalene (DIFFERIN) 0.1 % external cream Apply topically At Bedtime       spironolactone (ALDACTONE) 50 MG tablet Take 1 tablet (50 mg) by mouth 2 times daily 60 tablet 0     sulfacetamide sodium, Acne, 10 % lotion          No Known Allergies    Review of Systems:  -GI: The patient denies nausea, vomiting, diarrhea or abdominal pain.  -: No changes in menstrual cycle, no breast tenderness, no change in urination  -CVS: no palpitations, dizziness  -Constitutional: The patient denies fatigue, fevers, chills, unintended weight loss, and night sweats.  -HEENT: Patient denies nonhealing oral sores.  -Skin: As above in HPI. No additional skin concerns.    Physical exam:  Vitals: /68 (BP Location: Right arm, Patient Position: Sitting, Cuff Size: Adult Regular)   GEN: This is a well developed, well-nourished female in no acute distress, in a pleasant mood.    SKIN: Acne exam, which includes the face, neck, upper central chest, and upper central back was performed.  -Hyperpigmented macules on the lower face  -Active papules on the chin  -No other lesions of concern on areas examined.       Impression/Plan:  1. Acne vulgaris with PIH    Will start isotretinoin 40mg  this month. Goal dose is 150-220mg/kg for this 65.90kg patient.     Continue spironolactone 100mg every day    Side effects of spironolactone discussed including postural hypotension, increased frequency of urination, breast tenderness, menstrual spotting, cardiac arrythmias and the need for contraception due to fetal feminization.     There is no family history or personal history of breast malignancy known to patient    Discussion of the risks and side effects of isotretinoin including but not limited to mucocutaneous dryness, arthralgias, myalgias, depression, suicidal ideation, headache, blurred vision, increase in liver function test and increase in lipids. The iPledge program brochure was provided and the contents discussed with the patient. The  patient was counseled that they cannot give blood while on isotretinoin. Advised against tattoos and waxing. No personal or family history of inflammatory bowel disease or hypertriglyceridemia known to patient. Reviewed need to avoid alcohol on medication. The [employer] program consent was obtained. Patient counseled that if they wear contacts, the eyes may become too dry to tolerate. Recommend follow up with eye doctor if this occurs.      Discussed need for sun protection, at least SPF 30+.    Urine pregnancy test obtained today.     Contraception will include: IUD and male latex condoms.    This month: baseline labs including qualitative hCG, CBC, BUN/Cr, lipids and AST/ALT will be obtained.     iPledge # is [id number].     Patient will discontinue all other medications in except spironolactone - will continue this while on isotretinoin.      Recommended Cetaphil or Cerave cleanser and moisturizer    Total dose: 0mg/kg     CC Dr. Caceres on close of this encounter.  Follow-up in 1 month, earlier for new or changing lesions.       Staff Involved:  Staff/Scribe    Scribe Disclosure:  I, Eron Alvarez, am serving as a scribe to document services personally performed by Ryann Weber PA-C, based on data collection and the provider's statements to me.     Provider Disclosure:   The documentation recorded by the scribe accurately reflects the services I personally performed and the decisions made by me.    All risks, benefits and alternatives were discussed with patient.  Patient is in agreement and understands the assessment and plan.  All questions were answered.    Ryann Weber PA-C, MPAS  [facility] [place]: Phone: [phone], Fax: [phone]  [facility]: Phone: [phone],  Fax: [phone]

## 2020-01-20 NOTE — NURSING NOTE
Dermatology Rooming Note    Massiel Salvador's goals for this visit include:   Chief Complaint   Patient presents with     Accutane     Massiel is here today for an accutane follow up.      KATHRINE Majano

## 2020-01-20 NOTE — PROGRESS NOTES
[facility] Dermatology Note      Dermatology Problem List:  1. Acne Vulgaris - On isotretinoin 40 mg every day, Start of month #1.  -Current tx: spironolactone 50mg po BID, isotretinoin 40 mg every day  -Previous tx: doxycycline 100mg po BID, adapalene 0.1% cream, sulfacetamide sodium 10% lotion    Encounter Date: Jan 20, 2020    CC:  Chief Complaint   Patient presents with     Accutane     Massiel is here today for an accutane follow up.          History of Present Illness:  Ms. Massiel Salvador is a 19 year old female who presents as a follow-up for acne. The patient was last seen on 12/19/2019 when it was planned to start isotretinoin 40 mg next month. At today's visit, the patient notes she is ready to start accutane. She states she accidentally took 100 mg spironolactone daily (instead of 50mg daily) for the last month and denies experiencing side effects. The patient denies additional areas of concern. She notes she was on an antibiotic for a sinus infection 1 week ago but is no longer taking the medication. The patient denies painful, itching, tingling or bleeding lesions unless otherwise noted. Denies menstrual irregularities, breast tenderness, or excessive urination.     Past Medical History:   There is no problem list on file for this patient.    No past medical history on file.  No past surgical history on file.    Social History:   reports that she has never smoked. She has never used smokeless tobacco.    Family History:  Family History   Problem Relation Age of Onset     Melanoma No family hx of      Skin Cancer No family hx of        Medications:  Current Outpatient Medications   Medication Sig Dispense Refill     adapalene (DIFFERIN) 0.1 % external cream Apply topically At Bedtime       spironolactone (ALDACTONE) 50 MG tablet Take 1 tablet (50 mg) by mouth 2 times daily 60 tablet 0     sulfacetamide sodium, Acne, 10 % lotion          No Known Allergies    Review of Systems:  -GI: The  patient denies nausea, vomiting, diarrhea or abdominal pain.  -: No changes in menstrual cycle, no breast tenderness, no change in urination  -CVS: no palpitations, dizziness  -Constitutional: The patient denies fatigue, fevers, chills, unintended weight loss, and night sweats.  -HEENT: Patient denies nonhealing oral sores.  -Skin: As above in HPI. No additional skin concerns.    Physical exam:  Vitals: /68 (BP Location: Right arm, Patient Position: Sitting, Cuff Size: Adult Regular)   GEN: This is a well developed, well-nourished female in no acute distress, in a pleasant mood.    SKIN: Acne exam, which includes the face, neck, upper central chest, and upper central back was performed.  -Hyperpigmented macules on the lower face  -Active papules on the chin  -No other lesions of concern on areas examined.       Impression/Plan:  1. Acne vulgaris with PIH    Will start isotretinoin 40mg this month. Goal dose is 150-220mg/kg for this 65.90kg patient.     Continue spironolactone 100mg every day    Side effects of spironolactone discussed including postural hypotension, increased frequency of urination, breast tenderness, menstrual spotting, cardiac arrythmias and the need for contraception due to fetal feminization.     There is no family history or personal history of breast malignancy known to patient    Discussion of the risks and side effects of isotretinoin including but not limited to mucocutaneous dryness, arthralgias, myalgias, depression, suicidal ideation, headache, blurred vision, increase in liver function test and increase in lipids. The iPledge program brochure was provided and the contents discussed with the patient. The patient was counseled that they cannot give blood while on isotretinoin. Advised against tattoos and waxing. No personal or family history of inflammatory bowel disease or hypertriglyceridemia known to patient. Reviewed need to avoid alcohol on medication. The iPledge program  consent was obtained. Patient counseled that if they wear contacts, the eyes may become too dry to tolerate. Recommend follow up with eye doctor if this occurs.      Discussed need for sun protection, at least SPF 30+.    Urine pregnancy test obtained today.     Contraception will include: IUD and male latex condoms.    This month: baseline labs including qualitative hCG, CBC, BUN/Cr, lipids and AST/ALT will be obtained.     iPledge # is [id number].     Patient will discontinue all other medications in except spironolactone - will continue this while on isotretinoin.      Recommended Cetaphil or Cerave cleanser and moisturizer    Total dose: 0mg/kg     CC Dr. Caceres on close of this encounter.  Follow-up in 1 month, earlier for new or changing lesions.       Staff Involved:  Staff/Scribe    Scribe Disclosure:  I, Eron Alvarez, am serving as a scribe to document services personally performed by Ryann Weber PA-C, based on data collection and the provider's statements to me.     Provider Disclosure:   The documentation recorded by the scribe accurately reflects the services I personally performed and the decisions made by me.    All risks, benefits and alternatives were discussed with patient.  Patient is in agreement and understands the assessment and plan.  All questions were answered.    Ryann Weber PA-C, MPAS  [facility]: Phone: [phone], Fax: [phone]  [facility]: Phone: [phone],  Fax: [phone]

## 2020-02-01 DIAGNOSIS — L70.0 ACNE VULGARIS: ICD-10-CM

## 2020-02-03 RX ORDER — SPIRONOLACTONE 50 MG/1
TABLET, FILM COATED ORAL
Qty: 60 TABLET | Refills: 0 | OUTPATIENT
Start: 2020-02-03

## 2020-02-05 ENCOUNTER — TELEPHONE (OUTPATIENT)
Dept: DERMATOLOGY | Facility: CLINIC | Age: 20
End: 2020-02-05

## 2020-02-05 NOTE — TELEPHONE ENCOUNTER
Health Call Center    Phone Message    May a detailed message be left on voicemail: yes     Reason for Call: Symptoms or Concerns     If patient has red-flag symptoms, warm transfer to triage line    Current symptom or concern: Mental health issues, suicidal/negative thoughts, depression. She reports that she is not in danger of harming herself.  She thinks it is due to the Accutane as that can be a side effect and is wanting to go off the medication    Symptoms have been present for:  1 week(s)    Has patient previously been seen for this? No    By :     Date:     Are there any new or worsening symptoms? No      Action Taken: Message routed to:  [facility]    Travel Screening: Not Applicable

## 2020-02-06 ENCOUNTER — OFFICE VISIT (OUTPATIENT)
Dept: DERMATOLOGY | Facility: CLINIC | Age: 20
End: 2020-02-06
Payer: COMMERCIAL

## 2020-02-06 VITALS — SYSTOLIC BLOOD PRESSURE: 112 MMHG | DIASTOLIC BLOOD PRESSURE: 72 MMHG

## 2020-02-06 DIAGNOSIS — Z79.899 ON ISOTRETINOIN THERAPY: ICD-10-CM

## 2020-02-06 DIAGNOSIS — L70.0 ACNE VULGARIS: Primary | ICD-10-CM

## 2020-02-06 LAB — HCG UR QL: NEGATIVE

## 2020-02-06 RX ORDER — CEPHALEXIN 500 MG/1
500 CAPSULE ORAL 2 TIMES DAILY
Qty: 60 CAPSULE | Refills: 2 | Status: SHIPPED | OUTPATIENT
Start: 2020-02-06 | End: 2020-04-16

## 2020-02-06 ASSESSMENT — PAIN SCALES - GENERAL: PAINLEVEL: NO PAIN (0)

## 2020-02-06 NOTE — NURSING NOTE
Dermatology Rooming Note    Massiel Salvador's goals for this visit include:   Chief Complaint   Patient presents with     Accutane     Massiel is here for an accutane follow up- Massiel notes having negative thoughts.      KATHRINE Majano

## 2020-02-06 NOTE — TELEPHONE ENCOUNTER
----- Message from Brant Perez DO sent at 1/15/2019  4:01 PM EST -----  Please call patient and inform labs are normal Left voicemail asking to call clinic back.  Clinic number provided.    Muna Hudson LPN

## 2020-02-06 NOTE — TELEPHONE ENCOUNTER
I spoke to Massiel Salvador and she says that she's not in danger of harming herself. Yesterday she said was really bad with negative thoughts, but today she feels fine. She took her last Accutane dose 2 days ago. Told her to hold off on her dose today, and that I would be sending her message to Ryann Weber to advise. Patient states she has class today from 11:50a-4:00pm, so she can take a phone call before or after then.    Muna Hudson LPN

## 2020-02-06 NOTE — PROGRESS NOTES
[facility] Dermatology Note      Dermatology Problem List:  1. Acne Vulgaris   -Current tx: Clindamycin 1% lotion, Adapalene gel, Keflex 500 mg BID, spironolactone 50 mg every day  -Previous tx: doxycycline 100mg po BID, adapalene 0.1% cream, sulfacetamide sodium 10% lotion, accutane 40 mg every day - discontinued due to mood/anxiety changes    Encounter Date: Feb 6, 2020    CC:  Chief Complaint   Patient presents with     Accutane     Massiel is here for an accutane follow up- Massiel notes having negative thoughts.          History of Present Illness:  Ms. Massiel Salvador is a 19 year old female who presents as a follow-up for acne. The patient was last seen on 01/20/2020 when isotretinoin 40 mg every day was started for acne. At today's visit, the patient notes her anxiety has been very high lately. She states she has had anxiety her whole life but it has never been as high as it is after starting accutane. She states she did not take accutane yesterday due to this anxiety and feels better today than she did yesterday. She notes she takes hydroxyzine and used to take wellbutrin to control her anxiety. She discontinued the wellbutrin 2 years ago as she did not feel it helped. Does still take hydroxyzine, but only on occasion when she can't fall asleep because her mind is racing. She states that she typically exercises and reaches out to her support system to manage her anxiety as well. The patient notes she typically sees a therapist but will be switching to a new therapist next week, and has an appointment on Wednesday. The patient denies suicidal thoughts as well as suicide action plan but notes a feeling of hopelessness. The patient notes she is still on spironolactone 50 mg qPM and has tolerated this well for a long time. Would like to have a long term plan for her acne if she is not going to continue with isotretinoin at this time. The patient denies painful, itching, tingling or bleeding  lesions unless otherwise noted. Denies menstrual irregularities, breast tenderness, or excessive urination.     Past Medical History:   There is no problem list on file for this patient.    No past medical history on file.  No past surgical history on file.    Social History:   reports that she has never smoked. She has never used smokeless tobacco.    Family History:  Family History   Problem Relation Age of Onset     Melanoma No family hx of      Skin Cancer No family hx of        Medications:  Current Outpatient Medications   Medication Sig Dispense Refill     ISOtretinoin (ACCUTANE) 40 MG capsule Take 1 capsule (40 mg) by mouth daily 30 capsule 0     spironolactone (ALDACTONE) 50 MG tablet Take 1 tablet (50 mg) by mouth 2 times daily 60 tablet 0       No Known Allergies    Review of Systems:  -GI: The patient denies nausea, vomiting, diarrhea or abdominal pain.  -: No changes in menstrual cycle, no breast tenderness, no change in urination  -CVS: no palpitations, dizziness  -Constitutional: The patient denies fatigue, fevers, chills, unintended weight loss, and night sweats.  -HEENT: Patient denies nonhealing oral sores.  -Skin: As above in HPI. No additional skin concerns.  -PSYCH: notable increase in anxiety sx as well as feelings of hopelessness over the past 2-3 days, improved today since holding dose of isotretinoin last night. Nml affect.    Physical exam:  Vitals: /72 (BP Location: Right arm, Patient Position: Sitting, Cuff Size: Adult Regular)   GEN: This is a well developed, well-nourished female in no acute distress, in a pleasant mood.    SKIN: Acne exam, which includes the face, neck, upper central chest, and upper central back was performed.  -Hyperpigmented macules on the lower face  -Active papules on the chin  -No other lesions of concern on areas examined.       Impression/Plan:  1. Acne vulgaris with PIH, she only completed 2 weeks of isotretinoin therapy at 40mg daily  - Will  discontinue isotretinoin this month due to patient's anxiety and mood change.   - Urine pregnancy test obtained today and subsequent test will be obtained on 2/6/2020  - Patient to follow up with her therapist regarding her recently heightened anxiety and mood change- has appointment early next week already  - Offered immediate in clinic meeting with mental health/ but patient declined at this time, not having active SI or action plan  - Continue spironolactone 50mg every day  - Start Keflex 500 mg capsule, take 1 capsule (500 mg) po BID.  - Continue Adapalene gel qPM, already in patient's possession  - Continue clindamycin 1% lotion, already in patient's possession  -Will reevaluate for isotretinoin in 3mo with permission from patient's mental health provider     CC Dr. Caceres on close of this encounter.  Follow-up in 3 month, earlier for new or changing lesions.       Staff Involved:  Staff/Scribe    Scribe Disclosure:  I, Eron Alvarez, am serving as a scribe to document services personally performed by Ryann Weber PA-C, based on data collection and the provider's statements to me.     Provider Disclosure:   The documentation recorded by the scribe accurately reflects the services I personally performed and the decisions made by me.    All risks, benefits and alternatives were discussed with patient.  Patient is in agreement and understands the assessment and plan.  All questions were answered.    Ryann Weber PA-C, MPAS  [facility] [place]: Phone: [phone], Fax: [phone]  [facility]: Phone: [phone],  Fax: [phone]

## 2020-02-06 NOTE — LETTER
2/6/2020       RE: Massiel Salvador  [address]  [place] [address]     Dear Colleague,    Thank you for referring your patient, Massiel Salvador, to the [facility] DERMATOLOGY at [facility]. Please see a copy of my visit note below.    [facility] Dermatology Note      Dermatology Problem List:  1. Acne Vulgaris   -Current tx:  Clindamycin 1% lotion, Adapalene gel, Keflex 500 mg BID, spironolactone 50 mg every day  -Previous tx: doxycycline 100mg po BID, adapalene 0.1% cream, sulfacetamide sodium 10% lotion, accutane 40 mg every day - discontinued due to mood/anxiety changes    Encounter Date: Feb 6, 2020    CC:  Chief Complaint   Patient presents with     Accutane     Massiel is here for an accutane follow up- Massiel notes having negative thoughts.          History of Present Illness:  Ms. Massiel Salvador is a 19 year old female who presents as a follow-up for acne. The patient was last seen on 01/20/2020 when isotretinoin 40 mg every day was started for acne. At today's visit, the patient notes her anxiety has been very high lately. She states she has had anxiety her whole life but it has never been as high as it is after starting accutane. She states she did not take accutane yesterday due to this anxiety and feels better today than she did yesterday. She notes she takes hydroxyzine and used to take wellbutrin to control her anxiety. She discontinued the wellbutrin 2 years ago as she did not feel it helped. Does still take hydroxyzine, but only on occasion when she can't fall asleep because her mind is racing. She states that she typically exercises and reaches out to her support system to manage her anxiety as well. The patient notes she typically sees a therapist but will be switching to a new therapist next week, and has an appointment on Wednesday. The patient denies suicidal thoughts as well as suicide action plan but notes a feeling of hopelessness. The  patient notes she is still on spironolactone 50 mg qPM and has tolerated this well for a long time. Would like to have a long term plan for her acne if she is not going to continue with isotretinoin at this time. The patient denies painful, itching, tingling or bleeding lesions unless otherwise noted. Denies menstrual irregularities, breast tenderness, or excessive urination.     Past Medical History:   There is no problem list on file for this patient.    No past medical history on file.  No past surgical history on file.    Social History:   reports that she has never smoked. She has never used smokeless tobacco.    Family History:  Family History   Problem Relation Age of Onset     Melanoma No family hx of      Skin Cancer No family hx of        Medications:  Current Outpatient Medications   Medication Sig Dispense Refill     ISOtretinoin (ACCUTANE) 40 MG capsule Take 1 capsule (40 mg) by mouth daily 30 capsule 0     spironolactone (ALDACTONE) 50 MG tablet Take 1 tablet (50 mg) by mouth 2 times daily 60 tablet 0       No Known Allergies    Review of Systems:  -GI: The patient denies nausea, vomiting, diarrhea or abdominal pain.  -: No changes in menstrual cycle, no breast tenderness, no change in urination  -CVS: no palpitations, dizziness  -Constitutional: The patient denies fatigue, fevers, chills, unintended weight loss, and night sweats.  -HEENT: Patient denies nonhealing oral sores.  -Skin: As above in HPI. No additional skin concerns.  -PSYCH: notable increase in anxiety sx as well as feelings of hopelessness over the past 2-3 days, improved today since holding dose of isotretinoin last night. Nml affect.    Physical exam:  Vitals: /72 (BP Location: Right arm, Patient Position: Sitting, Cuff Size: Adult Regular)   GEN: This is a well developed, well-nourished female in no acute distress, in a pleasant mood.    SKIN: Acne exam, which includes the face, neck, upper central chest, and upper central  back was performed.  -Hyperpigmented macules on the lower face  -Active papules on the chin  -No other lesions of concern on areas examined.       Impression/Plan:  1. Acne vulgaris with PIH, she only completed 2 weeks of isotretinoin therapy at 40mg daily  - Will discontinue isotretinoin this month due to patient's anxiety and mood change.   - Urine pregnancy test obtained today and subsequent test will be obtained on 2/6/2020  - Patient to follow up with her therapist regarding her recently heightened anxiety and mood change- has appointment early next week already  - Offered immediate in clinic meeting with mental health/ but patient declined at this time, not having active SI or action plan  - Continue spironolactone 50mg every day  - Start Keflex 500 mg capsule, take 1 capsule (500 mg) po BID.  - Continue Adapalene gel qPM, already in patient's possession  - Continue clindamycin 1% lotion, already in patient's possession  -Will reevaluate for isotretinoin in 3mo with permission from patient's mental health provider     CC Dr. Caceres on close of this encounter.  Follow-up in 3 month, earlier for new or changing lesions.       Staff Involved:  Staff/Scribe    Scribe Disclosure:  I, Eron Alvarez, am serving as a scribe to document services personally performed by Ryann Weber PA-C, based on data collection and the provider's statements to me.     Provider Disclosure:   The documentation recorded by the scribe accurately reflects the services I personally performed and the decisions made by me.    All risks, benefits and alternatives were discussed with patient.  Patient is in agreement and understands the assessment and plan.  All questions were answered.    Ryann Weber PA-C, MPAS  [facility] [place]: Phone: [phone], Fax: [phone]  [facility]: Phone: [phone],  Fax: [phone]

## 2020-02-13 NOTE — TELEPHONE ENCOUNTER
I called the patient and she states that she was on Accutane for about 2 week and then she stopped about a week ago. She would like to know how soon she can get a tattoo after being off of Accutane.   Nina Pritchard, [facility]

## 2020-02-13 NOTE — TELEPHONE ENCOUNTER
[facility] Call Center    Phone Message    May a detailed message be left on voicemail: yes     Reason for Call: Other: Pt has questions about her use of acutane.  Please have staff call to discuss ASAP per Pt.     Action Taken: Message routed to:  [facility] dermatlology    Travel Screening: Not Applicable

## 2020-02-14 ENCOUNTER — TRANSFERRED RECORDS (OUTPATIENT)
Dept: HEALTH INFORMATION MANAGEMENT | Facility: CLINIC | Age: 20
End: 2020-02-14

## 2020-02-14 ENCOUNTER — MEDICAL CORRESPONDENCE (OUTPATIENT)
Dept: HEALTH INFORMATION MANAGEMENT | Facility: CLINIC | Age: 20
End: 2020-02-14

## 2020-02-14 NOTE — TELEPHONE ENCOUNTER
I called the patient back and I left a detailed message with the information below:   Ryann Weber, [facility]  You 16 hours ago (3:59 PM)      I would suggest 3mo.     There is insufficient evidence right now to give her an exact time frame, however just let her know that she is at risk for delayed wound healing and therefore to be safe I would recommend a 3mo wait before a tattoo at this point.     Ne [place] comment      I asked that she call back if she has any questions.   Nina Pritchard, [facility]

## 2020-03-04 ENCOUNTER — THERAPY VISIT (OUTPATIENT)
Dept: PHYSICAL THERAPY | Facility: CLINIC | Age: 20
End: 2020-03-04
Payer: COMMERCIAL

## 2020-03-04 DIAGNOSIS — N94.10 DYSPAREUNIA, FEMALE: ICD-10-CM

## 2020-03-04 PROCEDURE — 97112 NEUROMUSCULAR REEDUCATION: CPT | Mod: GP | Performed by: PHYSICAL THERAPIST

## 2020-03-04 PROCEDURE — 97140 MANUAL THERAPY 1/> REGIONS: CPT | Mod: GP | Performed by: PHYSICAL THERAPIST

## 2020-03-04 PROCEDURE — 97161 PT EVAL LOW COMPLEX 20 MIN: CPT | Mod: GP | Performed by: PHYSICAL THERAPIST

## 2020-03-04 PROCEDURE — 97535 SELF CARE MNGMENT TRAINING: CPT | Mod: GP | Performed by: PHYSICAL THERAPIST

## 2020-03-04 NOTE — PROGRESS NOTES
[facility] Initial Evaluation  Subjective:  The history is provided by the patient. No  was used.   Patient Health History  Massiel Salvador being seen for pelvic pain during sex.     Date of Onset: 2/14/20, date of order.   Problem occurred: had sex   Pain is reported as 1/10 on pain scale.  General health as reported by patient is good.  Health conditions: mental illness, migranes/headaches.   Red flags:  None as reported by patient.  Medical allergies: none.   Surgeries include:  Other (rhinoplasty aug 2019).    Current medications: cephalexan, spironolactone, hydroxozyne.    Current occupation is student, majoring in business.   Primary job tasks include:  Prolonged sitting.                                  Therapist Assessment:   Clinical Impression: Pt presents to [facility] with primary complaint of dyspareunia.  Per clinical examination, pt with increased pelvic floor muscle tone primarily in 1st and 2nd layers, and fair coordination of pelvic floor muscles.  Pt will benefit from skilled physical therapy for coordination training of pelvic floor muscles and dilator program.    Subjective: Patient presents today with dyspareunia. She is no longer having intercourse due to pain. The pt reports having chronic low back pain that she sees a chiropractor for frequently, which helps her pain. She has been using dilators the past few weeks. She has been using the 3-4th smallest size. The patient has been inserting for 5-15 minutes 3-4x a week.     Current activity:HIIT, yoga    Goals: reduce pain with intercourse    Urination   Do you leak on the way to the bathroom or with a strong urge to void? no  Do you leak with cough, sneeze, jumping, running? no  Any other activities that cause leaking? no  Do you have triggers that make you feel you can't wait to go to the bathroom? no What are they? n/a  Type of pad and number used per day?  none  When you leak what is the amount? none  How long can you delay the need to urinate? A couple hours  How many times do you get up to urinate at night?  Less than 1  How many times do you urinate during the day? Every 2-3 hours  Can you stop the flow of urine when on the toilet? yes  Is the volume of urine passed usually: average  Do you strain to pass urine? no  Do you have a slow or hesitant urinary stream? no  Do you have difficulty initiating the urine stream? no  How many bladder infections have you had in the last 12 months? 0  What is you fluid intake per day? Water (8oz) 5  Caffeine 2  Alcohol <3    Bowel Habits  Frequency of bowel movements? 1-2x a day  Consistency of stool? [place] stool scale? Soft formed  Do you ignore the urge to defecate? no  Do you strain to pass stool? no    Pelvic Pain  When do you have pelvic pain? During sex  Are you sexually active? no  Is initial penetration during intercourse painful? yes  Is deeper penetration painful? yes  Do you use lubrication? yes  Marinoff Scale:Level level3  (Level 3: Abstinence from intercourse because of severe pain. Level 2: Painful intercourse which limites frequency of activity. Level 1: Painful intercourse not severe enough to prevent activity.)  Have you given birth? no  Have you ever been worried for your physical safety? no  Any abdominal or pelvic surgeries? no  Are you having regular exercise? yes  Have you practiced the PF (kegel) exercise for 4 or more weeks? no    Objective:    OBSERVATION:  Rounded shoulders    FLEXIBILITY: iliopsoas tightness    ABDOMINAL WALL: no tenderness to palpation of: obliques, transverse abdominis, iliopsoas, and adductors    EXTERNAL ASSESSMENT:  Skin condition:normal  Bearing down/coughing:NT  Muscle contraction/perineal mobility: elevation and urogenital triangle descent, slight lift, no urogenital triangle descent, no movement, substitution      INTERNAL ASSESSMENT:  Baseline PF tone:hypertonicity  PF Tone  with cough: NT   PF Response quality: inconsistently sluggish  PF Power: Center: 3/5  Accessory Muscle use: none  Endurance: Maximum contraction in seconds:10 seconds, no increase in discomfort with test   Quick contraction repetitions prior to fatigue: not tested     Lumbar ROM: WNL      Assessment/Plan:    Patient is a 19 year old female with pelvic complaints.    Patient has the following significant findings with corresponding treatment plan.                Diagnosis 1:  Pelvic floor dysfunction  Pain -  hot/cold therapy, US, electric stimulation, mechanical traction, manual therapy, STS, splint/taping/bracing/orthotics, self management, education, directional preference exercise and home program  Decreased ROM/flexibility - manual therapy, therapeutic exercise, therapeutic activity and home program  Impaired muscle performance - biofeedback, electric stimulation, neuro re-education and home program    Therapy Evaluation Codes:   Cumulative Therapy Evaluation is: Low complexity.    Previous and current functional limitations:  (See Goal Flow Sheet for this information)    Short term and Long term goals: (See Goal Flow Sheet for this information)     Communication ability:  Patient appears to be able to clearly communicate and understand verbal and written communication and follow directions correctly.  Treatment Explanation - The following has been discussed with the patient:   RX ordered/plan of care  Anticipated outcomes  Possible risks and side effects  This patient would benefit from PT intervention to resume normal activities.   Rehab potential is good.    Frequency:  1 X week, once daily  Duration:  for 2 weeks tapering to 2 X a month over 2 months  Discharge Plan:  Achieve all LTG.  Independent in home treatment program.  Reach maximal therapeutic benefit.    Please refer to the daily flowsheet for treatment today, total treatment time and time spent performing 1:1 timed codes.

## 2020-03-05 DIAGNOSIS — L70.0 ACNE VULGARIS: ICD-10-CM

## 2020-03-06 NOTE — TELEPHONE ENCOUNTER
SPIRONOLACTONE 50 MG TABLET      Last Written Prescription Date:  1/20/20  Last Fill Quantity: 60,   # refills: 0  Last Office Visit : 2/6/20  Future Office visit:  5/15/20  Last dictation note: Continue spironolactone 50mg every day  Routing refill request to provider for review/approval because:  Discrepancy between med list/refill request and provider last dictation note  No change made to requested refill

## 2020-03-11 RX ORDER — SPIRONOLACTONE 50 MG/1
50 TABLET, FILM COATED ORAL 2 TIMES DAILY
Qty: 60 TABLET | Refills: 3 | Status: SHIPPED | OUTPATIENT
Start: 2020-03-11 | End: 2020-04-16

## 2020-03-15 ENCOUNTER — COMMUNICATION - HEALTHEAST (OUTPATIENT)
Dept: SCHEDULING | Facility: CLINIC | Age: 20
End: 2020-03-15

## 2020-03-27 ENCOUNTER — THERAPY VISIT (OUTPATIENT)
Dept: PHYSICAL THERAPY | Facility: CLINIC | Age: 20
End: 2020-03-27
Payer: COMMERCIAL

## 2020-03-27 DIAGNOSIS — N94.10 DYSPAREUNIA, FEMALE: ICD-10-CM

## 2020-03-27 PROCEDURE — 99207 C NONPHYSICIAN TELEPHONE ASSESSMENT 21-30 MIN: CPT | Mod: GP | Performed by: PHYSICAL THERAPIST

## 2020-03-27 NOTE — PROGRESS NOTES
"Physical Therapy Virtual Follow Up      The patient has been notified of following:     \"This virtual visit will be conducted between you and your provider. We have found that certain health care needs can be provided without the need for physical presence.  This service lets us provide the care you need with a short virtual visit.\"    Due to external, as well as internal [facility] management of the COVID-19 Virus, Massiel Salvador was not seen in our clinic.  As a substitution, we implemented a virtual visit to manage this patient's condition utilizing the [employer] virtual visit platform via the patient s existing code.  The provider, Melva Hines, reviewed the patient's chart, PTRx prescription, and spoke with the patient to determine the following telemedicine visit is appropriate and effective for the patient's care.    The following type of visit was completed:   Video Visit:  The [employer] platform uses a synchronous HIPAA compliant video stream for this patient encounter.          S: Massiel Salvador is a 19 year old female. Connected virtually on the [employer] platform with Massiel Salvador to discuss their condition/progress. They noted improvements in posture.  They noted ongoing pain or limitations with sitting and intercourse.     Current pain level: No change  She has been inconsistent with using her dilators after going on vacation. She plans to use her dilators daily now. She noted some burning with the 4th dilator size out of 8 yesterday.  She plans to start using them daily.  The pt has been going for runs and performs yoga daily. Yesterday she threw her  back out , and she is taking muscles relaxers.      O: Patient demonstrated ability to use 4th largest size dilator out of 8.     Therapeutic Exercise   Exercise Name: Pelvic Floor Muscle Strengthening Elevator , Sets: 1 - Reps: 10 - Sessions: 1x, Notes: Sit on towel for feedback. Only contract pelvic floor muscles 25% for 2 seconds, relax, then slightly push " "pelvic floor muscles down into towel for 2 seconds.  Exercise Name: All 4s Stretch, Notes: Bring knees out to the sides. Set a time for 3 minutes and perform daily. Practice diaphragmatic breathing when performing    Neuromuscular re-ed:  -Exercise Name: Neutral Spine Standing, Notes: Make sure rib change is over pelvis and your tuck your tail bone slightly (keep glutes relaxed)   - Educated pt on on piston like relationship with PFM and diaphragm. Discussed pt checking in with posture and standing throughout the day.  - Pt instructed on diaphragmatic breathing in child's pose with return demonstration. Verbal cuing with good demonstration of expanding lower rib cage \"opening up like an umbrella\". -5 minutes    Self Care:  -Reviewed how to use dilators and instructed not to push into greater than 3/10 discomfort.   -Reviewed having pt perform every day for at least 10 minutes. Reviewed trigger point release of PFM.  -Educated pt on anatomy of PFM with model for reference. Educated pt on how PFM activation directly affect movement of the coccyx.    A:   Demonstrated good understanding of exercises with no increase in pain symptoms. Demonstrated good standing and sitting posture after cueing.     P: Patient will continue with the exercise program assigned on their PTRx code and will add the following measures to manage their pain/condition: diaphragmatic breathing, stretches, and coordination training of pelvic floor muscles to reduce pain with functional mobility.        Virtual visit contact time    Time of service began: 2:00 PM  Time of service ended: 2:35 PM  Total Time for set up, visit, and documentation: 8 minutes    Procedure Code/s (For internal tracking only, please document any charges you would apply)  Therapeutic Exercise (69980): 15 minutes  Neuromuscular Re-education (69114): 10 minutes  Self Care / Home Management Training (46945): 10 minutes    I have reviewed the note as documented above.  This " accurately captures the substance of my conversation with the patient.  Provider location: [place] ([facility])  Patient location: @[facility]@    ___________________________________________________________________________________________________________________

## 2020-04-03 ENCOUNTER — VIRTUAL VISIT (OUTPATIENT)
Dept: PHYSICAL THERAPY | Facility: CLINIC | Age: 20
End: 2020-04-03
Payer: COMMERCIAL

## 2020-04-03 DIAGNOSIS — N94.10 DYSPAREUNIA, FEMALE: ICD-10-CM

## 2020-04-03 PROCEDURE — 97530 THERAPEUTIC ACTIVITIES: CPT | Mod: 95 | Performed by: PHYSICAL THERAPIST

## 2020-04-03 PROCEDURE — 97110 THERAPEUTIC EXERCISES: CPT | Mod: 95 | Performed by: PHYSICAL THERAPIST

## 2020-04-03 NOTE — PROGRESS NOTES
"Physical Therapy Virtual Follow Up Visit      The patient has been notified of following:     \"This virtual visit will be conducted between you and your provider. We have found that certain health care needs can be provided without the need for physical presence.  This service lets us provide the care you need with a virtual visit.\"    Due to external, as well as internal [facility] management of the COVID-19 Virus, Massiel Salvador was not seen in our clinic.  As a substitution, we implemented a virtual visit to manage this patient's condition utilizing the PTRx virtual visit platform via the patient s existing code.  The provider, Melva Hines, reviewed the patient's chart, PTRx prescription, and spoke with the patient to determine the following telemedicine visit is appropriate and effective for the patient's care.    The following type of visit was completed:   Video Visit:  The PTRx platform uses a synchronous HIPAA compliant video stream for this patient encounter.        S: Massiel Salvador is a 19 year old female. Connected virtually on the PTRx platform to discuss their condition/progress. They noted improvements in coccyx pain and low back pain.  They noted ongoing pain or limitations with pain with intercourse and low back discomfort.     Current pain level: 3/10    O: Patient demonstrated:  Pt reported no episodes of tailbone pain this last week.  Lumbar AROM: WNL, reduction of low back pain after repeated standing lumbar flexion 10x.    PTRx Content from today's visit:  Therapeutic Exercise  Pelvic Floor Muscle Strengthening Elevator , Sets: 1 - Reps: 10 - Sessions: 1x, Notes: Sit on towel for feedback. Only contract pelvic floor muscles 25% for 2 seconds, relax, then slightly push pelvic floor muscles down into towel for 2 seconds.- Verbal review    Exercise Name: All 4s Stretch, Notes: Bring knees out to the sides. Set a time for 3 minutes and perform daily. Practice diaphragmatic breathing when " performing. Discussed pt performing slight 25-50% PFM contractions in this position every other day, holding contraction for 2 seconds then letting go.    Exercise Name: Kneeling Hip Flexor Stretch, Sets: 3 - Reps: 30-60 seconds - Sessions: 1x a day, Notes: Make sure to do after Pilates     Exercise Name: Lumbar Flexion in Standing with/without Patient Overpressure, Sets: 1 - Reps: 8-10 - Sessions: Every 3 hours   Exercise Name:     Suboccipital Release, Notes: Place two tennis balls in a sock and tie the other end. Place on pillow and lay on back where tennis balls are pressing into the base of your skull. Perform for ~3 minutes.     Therapeutic Activity:  Educated pt on timeline use of dilators. Instructed pt on importance of consistently using dilators for 6 weeks to see significant improvement in symptoms.   Reviewed use and frequency of dilator use. Instructed pt to tilt smallest dilator side with release of 3rd layer of PFMs.    A:   Patient's symptoms are resolving.  Response to therapy has shown an improvement in  pain level and coordination  Patient will benefit from continued skilled physical therapy to address impairments listed above in order to perform functional mobilities and ADLs pain free.    P: Patient will continue with the exercise program assigned on their PTRx code and will add the following measures to manage their pain/condition: PFM tension, low back and coccyx pain     Current treatment program is being advanced to more complex exercises.      Virtual visit contact time    Time of service began: 8:20 AM  Time of service ended: 8:46 AM  Total Time for set up, visit, and documentation: 26 minutes    Payor: BCBS / Plan: [employer] [place] / Product Type: Indemnity /   .  Procedure Code/s   Therapeutic Exercise (80078): 18 minutes  Therapeutic Activities (05184): 8 minutes    I have reviewed the note as documented above.  This accurately captures the substance of my conversation with the  patient.  Provider location: [place] (City/State)  Patient location: home    ___________________________________________________

## 2020-04-07 ENCOUNTER — TELEPHONE (OUTPATIENT)
Dept: DERMATOLOGY | Facility: CLINIC | Age: 20
End: 2020-04-07

## 2020-04-07 NOTE — TELEPHONE ENCOUNTER
"Teledermatology Nurse Call for RETURN patients seen within the last 3 years:    The patient was contacted by phone and we reviewed, \"Due to the coronavirus pandemic, we are calling to review your visit and offer you a teledermatology visit where you send in photos via [employer]. These photos will be seen by an MD or CLOVIS. This will be billed to you and your insurance.\"  The patient was also told that \"a teledermatology visit is not as thorough as an in-person visit and that the quality of the photograph sent may not be of the same quality as that taken by the dermatology clinic, but the patient would like to proceed with an teledermatology because of National Emergency Regarding Coronavirus disease (COVID 19) Outbreak.\"  \"If a prescription is necessary we can send it directly to your pharmacy.  If lab work is needed we can place an order for that and you can then stop by our lab to have the test done at a later time.\"    The patient understood that they may receive a call from the clinic to review additional history, may still be instructed to come to clinic even after photo review and be billed for both visits with an MD. They were told that a photo assessment does not replace an in person skin exam. The patient understood that teledermatology is not for urgent issues and would require up to 3 business days for review. The patient denied skin pain, fever, mucosal symptoms (lesions, blisters, sores in the mouth, nose, eyes, or genitals)  IF PATIENT ENDORSES ANY OF THESE STOP AND PAGE  ON CALL ATTENDING. IF OTHER POSSIBLY URGENT SYMPTOMS THEN PAGE PHYSICIAN YOU ARE SCHEDULING WITH OR ON CALL IF NO ANSWER.       The patient chose to:                                                                                                                                                                                                                    The patient selected Jerry. This video visit will be conducted via a call " between you and your physician/provider via [employer]. We have found that certain health care needs can be provided without the need for an in-person physical exam.  This service lets us provide the care you need with a video conversation. f during the course of the call the physician/provider feels a video visit is not appropriate, you will not be charged for this service. Patient would like the video invitation sent by: Send to e-mail at: [email or web address]                                                                                                                                                                                                           Patient concerns for this return visit: None    Nursing tasks completed  -Pharmacy preference was updated.  -The nurse has dropped in the AVS information *(For adults the phrase is umdermhteleavs and for pediatrics it is their own) for the physician to route in the AVS.                                                                                                                                                                                                                         -The patient was told to contact the clinic if they have not received correspondence within 72 hours.

## 2020-04-16 ENCOUNTER — VIRTUAL VISIT (OUTPATIENT)
Dept: DERMATOLOGY | Facility: CLINIC | Age: 20
End: 2020-04-16
Payer: COMMERCIAL

## 2020-04-16 ENCOUNTER — TELEPHONE (OUTPATIENT)
Dept: DERMATOLOGY | Facility: CLINIC | Age: 20
End: 2020-04-16

## 2020-04-16 DIAGNOSIS — L70.0 ACNE VULGARIS: Primary | ICD-10-CM

## 2020-04-16 RX ORDER — SPIRONOLACTONE 50 MG/1
50 TABLET, FILM COATED ORAL 2 TIMES DAILY
Qty: 60 TABLET | Refills: 3 | Status: SHIPPED | OUTPATIENT
Start: 2020-04-16 | End: 2020-05-28

## 2020-04-16 RX ORDER — TRETINOIN 0.5 MG/G
CREAM TOPICAL AT BEDTIME
Qty: 45 G | Refills: 1 | Status: SHIPPED | OUTPATIENT
Start: 2020-04-16 | End: 2020-05-28

## 2020-04-16 RX ORDER — ESCITALOPRAM OXALATE 5 MG/1
5 TABLET ORAL DAILY
COMMUNITY

## 2020-04-16 RX ORDER — CLINDAMYCIN PHOSPHATE 10 UG/ML
LOTION TOPICAL 2 TIMES DAILY
Qty: 60 ML | Refills: 1 | Status: SHIPPED | OUTPATIENT
Start: 2020-04-16 | End: 2020-05-28

## 2020-04-16 NOTE — PROGRESS NOTES
Dermatology Phone Visit    Dermatology Problem List:  1. Acne Vulgaris   -Current tx: Clindamycin 1% lotion, tretinoin 0.05% cream, spironolactone 50 mg BID and BPO cleanser  -Previous tx: doxycycline 100mg po BID, adapalene 0.1% cream, sulfacetamide sodium 10% lotion, spironolactone 50mg every day, accutane 40 mg every day - discontinued after 2 weeks due to mood/anxiety changes    Patient opted to conduct today's return visit via telephone vs an in person visit to the clinic.    Encounter Date: Apr 16, 2020    Chief complaint:   Chief Complaint   Patient presents with     Derm Problem     Massiel is following up regarding acne and feels she is worse at this time.       I spoke with: Massiel Salvador    The reason for the telephone visit was: acne follow-up    Pertinent history and review of systems:     Massiel is a 20yo female who is a return patient follows up regarding her acne. Last visit we stopped her isotretinoin (after only 2 weeks) due to increased mood changes. Since that time she has started treatment with Lexapro, and has been on it about 2 weeks. She notes she is checking in with her PCP regarding the lexapro soon and expects it to take effect sometime within the next month. She has been taking spironolactone for about 1 year, but she decided to increase her dose to 100mg daily last month. She notes a flaring of her acne currently, especially on the cheeks. She continue with BPO cleanser. She also has been using an OTC retinol cream which she likes. She says she has been taking the Keflex as well, but has not noticed an improvement with that and lastly, she has been mixing in tea tree oil to her moisturizer in order to try and calm the inflammation. She notes her depressive sx and anxiety feel a lot better right now and notes that looking back she thinks her mood sx were situation and due to some external factors occurring in her life at the time. She is expressing interest in trying the isotretinoin in the  future again. She is otherwise doing well and has no other concerns today.     Review of Systems:  -GI: The patient denies nausea, vomiting, diarrhea or abdominal pain.  -: No changes in menstrual cycle, + breast tenderness, no change in urination  -CVS: no palpitations, dizziness  -Constitutional: The patient denies fatigue, fevers, chills, unintended weight loss, and night sweats.  -HEENT: Patient denies nonhealing oral sores.  -Skin: As above in HPI. No additional skin concerns.  -PSYCH: anxiety sx have improved, no SI    Assessment: Acne, continuing to flare    Advice/instructions given to patient/guardian including prescriptions, follow up appointment or orders for diagnostic testing:  -Continue spironolactone 50mg po BID for now - advised it may take some time for this higher dose to take an effect. Continue to monitor for changes such as breast tenderness, menstrual changes and heart palpitations/dizziness.   -Continue with BPO cleanser BID  -Restart clindamycin 1% lotion qAM  -Stop tea tree oil, stop OTC retinol cream and stop keflex  -Start tretinoin 0.05% cream QPM - edu on dryness  -continue with sun protection  -Continue with lexapro daily  -Will reassess need and appropriate use of isotretinoin at next visit, will want approval from patient's mental health provider regarding use of isotretinoin    Follow-up 6 weeks, sooner for new or changing lesions  CC Dr. Nina at close of this encounter    Phone call contact time:  Call Started at: 1:00pm  Call Ended at: 1:13pm    Staff only:    All risks, benefits and alternatives were discussed with patient.  Patient is in agreement and understands the assessment and plan.  All questions were answered.    Ryann Weber PA-C, MPAS  [facility] [place]: Phone: [phone], Fax: [phone]  [facility]: Phone: [phone],  Fax: [phone]

## 2020-04-16 NOTE — TELEPHONE ENCOUNTER
Attempted to reach pt to schedule follow up with Ryann SHAFER to contact clinic. Ryann would like to see her for acne follow up in 6 weeks. Please assist with scheduling if she calls back.

## 2020-04-16 NOTE — PATIENT INSTRUCTIONS
[facility] Teledermatology Visit    Thank you for allowing us to participate in your care. Your findings, instructions and follow-up plan are as follows:    Acne:  -Continue spironolactone 50mg twice daily for now - advised it may take some time for this higher dose to take an effect. Continue to monitor for changes such as breast tenderness, menstrual changes and heart palpitations/dizziness.   -Continue with BPO cleanser twice daily  -Restart clindamycin 1% lotion in the morning  -Stop tea tree oil, stop OTC retinol cream and stop cephalexin  -Start tretinoin 0.05% cream at night on clean skin  -continue with sun protection, at least SPF 30+  -Continue with lexapro daily  -Will reassess need and appropriate use of isotretinoin(Accutane) at next visit, will want approval from patient's mental health provider regarding use of isotretinoin    When should I call my doctor?    If you are worsening or not improving, please, contact us or seek urgent care as noted below.     Who should I call with questions (adults)?    [facility] (adult and pediatric): [phone]     [facility] (adult): [phone]    For urgent needs outside of business hours call the [facility] at [phone] and ask for the dermatology resident on call    If this is a medical emergency and you are unable to reach an ER, Call 360      Who should I call with questions (pediatric)?  [facility]- Pediatric Dermatology  Dr. Mable Austin, Dr. Nico Rojas, Dr. Oriana Rod, Brianda PlunkettC[facility] Hospit[facility], ALONZO Blackburn, Dr. Jeannine Nina & Dr. Suman Dutton  Non Urgent  Nurse Triage Line; [phone]- Lynsey and Lyssa WARD Care Coordinators   Analia (/Complex ) [phone]    If you need a prescription refill, please contact your pharmacy. Refills are approved or denied by our Physicians during normal business  hours, Monday through Fridays  Per office policy, refills will not be granted if you have not been seen within the past year (or sooner depending on your child's condition)    Scheduling Information:  Pediatric Appointment Scheduling and Call Center [phone]  Radiology Scheduling- [phone]  Sedation Unit Scheduling- [phone]  [place] Scheduling- General [phone]; Pediatric Dermatology [phone]  Main  Services: [phone]  Polish: [phone]  Swedish: [phone]  Hmong/Kenyan/Portuguese: [phone]  Preadmission Nursing Department Fax Number: [phone] (Fax all pre-operative paperwork to this number)    For urgent matters arising during evenings, weekends, or holidays that cannot wait for normal business hours please call [phone] and ask for the Dermatology Resident On-Call to be paged.

## 2020-04-17 ENCOUNTER — VIRTUAL VISIT (OUTPATIENT)
Dept: PHYSICAL THERAPY | Facility: CLINIC | Age: 20
End: 2020-04-17
Payer: COMMERCIAL

## 2020-04-17 DIAGNOSIS — N94.10 DYSPAREUNIA, FEMALE: ICD-10-CM

## 2020-04-17 PROCEDURE — 97110 THERAPEUTIC EXERCISES: CPT | Mod: 95 | Performed by: PHYSICAL THERAPIST

## 2020-04-17 NOTE — PROGRESS NOTES
"PROGRESS  REPORT    Physical Therapy Virtual Follow Up Visit      Progress reporting period is from 3/4/20 to 4/17/20.       The patient has been notified of following:     \"This virtual visit will be conducted between you and your provider. We have found that certain health care needs can be provided without the need for physical presence.  This service lets us provide the care you need with a virtual visit.\"    Due to external, as well as internal [facility] management of the COVID-19 Virus, Massiel Salvador was not seen in our clinic.  As a substitution, we implemented a virtual visit to manage this patient's condition utilizing the PTRx virtual visit platform via the patient s existing code.  The provider, Melva Hines, reviewed the patient's chart, PTRx prescription, and spoke with the patient to determine the following telemedicine visit is appropriate and effective for the patient's care.    The following type of visit was completed:   Video Visit:  The [employer] platform uses a synchronous HIPAA compliant video stream for this patient encounter.      SUBJECTIVE  Subjective changes noted by patient:  Massiel Salvador is a 19 year old female. Connected virtually on the PTRx platform to discuss their condition/progress. They noted improvements in low back pain, tailbone pain and she was able to progress to 5th largest dilator size (out of 8).  They noted ongoing pain or limitations with pelvic floor coordination in sitting and pelvic floor tension.  Subjective: see note    Current pain level is 2/10 mid back discomfort  .     Previous pain level was  3/10 Initial Pain level: 1/10.   Changes in function:  Yes (See Goal flowsheet attached for changes in current functional level)  Adverse reaction to treatment or activity: None    OBJECTIVE  Changes noted in objective findings:  Yes,     Lumbar AROM: WNL, no pain throughout movement in all directions    Posture: anterior pelvic tilt in standing    Hip AROM: " reduced IR bilaterally    Dilator use: 5th largest size out of 8 (was using 4th largest 2 weeks ago)     System  Physical Exam  General   ROS    PTRx Content from today's visit:    Exercise Name: Pelvic Floor Muscle Strengthening Elevator , Sets: 1 - Reps: 10 - Sessions: 1x, Notes: Sit on towel for feedback. Only contract pelvic floor muscles 25% for 2 seconds, relax, then slightly push pelvic floor muscles down into towel for 2 seconds.-- Practiced in standing today   Pt demonstrated good coordination of PFM in standing.    Exercise Name: All 4s Stretch, Notes: Bring knees out to the sides. Set a time for 3 minutes and perform daily. Practice diaphragmatic breathing when performing-- Verbally reviewed    Exercise Name: Neutral Spine Standing, Notes: Make sure rib change is over pelvis and your tuck your tail bone slightly (keep glutes relaxed).    Assume good posture then with arms straight holding on to green band, engage core (draw belly button in) then pull arms away at diagonals. Keep shoulders relaxed. Repeat 20-30x, 5x a week.    Exercise Name: Kneeling Hip Flexor Stretch, Sets: 3 - Reps: 30-60 seconds - Sessions: 1x a day, Notes: Make sure to do after Pilates.  Make sure to square hips and tuck tailbone from front of hip stretch. Reach arm up overhead with leg that is back (hold on to chair for balance). rotated chest up toward ceiling for added stretch while you breathe in. Exhale as you lower your arm down. Can repeat 10x on each side or hold stretch without arm movement.    Exercise Name: Lumbar Flexion in Standing with/without Patient Overpressure, Sets: 1 - Reps: 8-10 - Sessions: Every 3 hours     Exercise Name: 90/90 Hip stretch: Notes: Only use light finger tips for stability. Try and draw both sitz bones toward the ground. Hold for 5-10 breaths and then switch to other side. Perform 5x a week    ASSESSMENT/PLAN  Updated problem list and treatment plan: Diagnosis 1:  Pelvic floor dysfunction  Pain -   hot/cold therapy, US, electric stimulation, mechanical traction, manual therapy, STS, splint/taping/bracing/orthotics, self management, education, directional preference exercise and home program  Decreased ROM/flexibility - manual therapy, therapeutic exercise, therapeutic activity and home program  Impaired muscle performance - biofeedback, electric stimulation, neuro re-education and home program  Impaired posture - neuro re-education, therapeutic activities and home program  STG/LTGs have been met or progress has been made towards goals:  Yes (See Goal flow sheet completed today.)  Assessment of Progress: The patient's condition is improving.  Self Management Plans:  Patient has been instructed in a home treatment program.  Patient  has been instructed in self management of symptoms.  I have re-evaluated this patient and find that the nature, scope, duration and intensity of the therapy is appropriate for the medical condition of the patient.  Massiel continues to require the following intervention to meet STG and LTG's:  PT    Recommendations:  Patient will continue with the exercise program assigned on their PTRx code.  This patient would benefit from continued therapy.     Frequency:  2 X a month, once daily  Duration:  for 3 months          Virtual visit contact time    Time of service began: 8:20 AM  Time of service ended: 8:59 AM  Total Time for set up, visit, and documentation: 45 minutes    Payor: BCBS / Plan: [place]BS [place] / Product Type: Indemnity /     Procedure Code/s   Therapeutic Exercise (94582): 39 minutes    I have reviewed the note as documented above.  This accurately captures the substance of my conversation with the patient.  Provider location: [facility] ([facility]/State)  Patient location: home

## 2020-04-17 NOTE — LETTER
"[facility] PHYSICAL THERAPY  [id number]  [place] [address]  [phone]    2020    Re: Massiel Salvador   :   2000  MRN:  [id number]   REFERRING PHYSICIAN:   [facility]  Date of Initial Evaluation:  3/4/20  Visits:  Rxs Used: 4  Reason for Referral:  Dyspareunia, female    PROGRESS  REPORT    Physical Therapy Virtual Follow Up Visit  Progress reporting period is from 3/4/20 to 20.       The patient has been notified of following:   \"This virtual visit will be conducted between you and your provider. We have found that certain health care needs can be provided without the need for physical presence.  This service lets us provide the care you need with a virtual visit.\"  Due to external, as well as internal [facility] management of the COVID-19 Virus, Massiel Salvador was not seen in our clinic.  As a substitution, we implemented a virtual visit to manage this patient's condition utilizing the [employer] virtual visit platform via the patient s existing code.  The provider, Melva Hines, reviewed the patient's chart, PTRx prescription, and spoke with the patient to determine the following telemedicine visit is appropriate and effective for the patient's care.  The following type of visit was completed:   Video Visit:  The [employer] platform uses a synchronous HIPAA compliant video stream for this patient encounter.      SUBJECTIVE  Subjective changes noted by patient:  Massiel Salvador is a 19 year old female. Connected virtually on the [employer] platform to discuss their condition/progress. They noted improvements in low back pain, tailbone pain and she was able to progress to 5th largest dilator size (out of 8).  They noted ongoing pain or limitations with pelvic floor coordination in sitting and pelvic floor tension.  Subjective: see note    Current pain level is 2/10 mid back " discomfort  .     Previous pain level was  3/10 Initial Pain level: 1/10.   Changes in function:  Yes (See Goal flowsheet attached for changes in current functional level)  Adverse reaction to treatment or activity: None    OBJECTIVE  Changes noted in objective findings:  Yes,   Lumbar AROM: WNL, no pain throughout movement in all directions  Posture: anterior pelvic tilt in standing  Hip AROM: reduced IR bilaterally  Dilator use: 5th largest size out of 8 (was using 4th largest 2 weeks ago)  PTRx Content from today's visit:  Exercise Name: Pelvic Floor Muscle Strengthening Elevator , Sets: 1 - Reps: 10 - Sessions: 1x, Notes: Sit on towel for feedback. Only contract pelvic floor muscles 25% for 2 seconds, relax, then slightly push pelvic floor muscles down into towel for 2 seconds.-- Practiced in standing today   Re: Massiel Salvador   :   2000        Pt demonstrated good coordination of PFM in standing.  Exercise Name: All 4s Stretch, Notes: Bring knees out to the sides. Set a time for 3 minutes and perform daily. Practice diaphragmatic breathing when performing-- Verbally reviewed    Exercise Name: Neutral Spine Standing, Notes: Make sure rib change is over pelvis and your tuck your tail bone slightly (keep glutes relaxed).    Assume good posture then with arms straight holding on to green band, engage core (draw belly button in) then pull arms away at diagonals. Keep shoulders relaxed. Repeat 20-30x, 5x a week.    Exercise Name: Kneeling Hip Flexor Stretch, Sets: 3 - Reps: 30-60 seconds - Sessions: 1x a day, Notes: Make sure to do after Pilates.  Make sure to square hips and tuck tailbone from front of hip stretch. Reach arm up overhead with leg that is back (hold on to chair for balance). rotated chest up toward ceiling for added stretch while you breathe in. Exhale as you lower your arm down. Can repeat 10x on each side or hold stretch without arm movement.    Exercise Name: Lumbar Flexion in Standing  with/without Patient Overpressure, Sets: 1 - Reps: 8-10 - Sessions: Every 3 hours     Exercise Name: 90/90 Hip stretch: Notes: Only use light finger tips for stability. Try and draw both sitz bones toward the ground. Hold for 5-10 breaths and then switch to other side. Perform 5x a week    ASSESSMENT/PLAN  Updated problem list and treatment plan: Diagnosis 1:  Pelvic floor dysfunction  Pain -  hot/cold therapy, US, electric stimulation, mechanical traction, manual therapy, STS, splint/taping/bracing/orthotics, self management, education, directional preference exercise and home program  Decreased ROM/flexibility - manual therapy, therapeutic exercise, therapeutic activity and home program  Impaired muscle performance - biofeedback, electric stimulation, neuro re-education and home program  Impaired posture - neuro re-education, therapeutic activities and home program  STG/LTGs have been met or progress has been made towards goals:  Yes (See Goal flow sheet completed today.)  Assessment of Progress: The patient's condition is improving.  Self Management Plans:  Patient has been instructed in a home treatment program.  Patient  has been instructed in self management of symptoms.  I have re-evaluated this patient and find that the nature, scope, duration and intensity of the therapy is appropriate for the medical condition of the patient.  Massiel continues to require the following intervention to meet STG and LTG's:  PT    Recommendations:  Patient will continue with the exercise program assigned on their PTRx code.  This patient would benefit from continued therapy.     Frequency:  2 X a month, once daily  Duration:  for 3 months      Virtual visit contact time  Time of service began: 8:20 AM  Time of service ended: 8:59 AM  Total Time for set up, visit, and documentation: 45 minutes  Re: Massiel Salvador   :   2000          Payor: [place] / Plan: [place] / Product Type: Indemnity /   Procedure Code/s    Therapeutic Exercise (34548): 39 minutes    I have reviewed the note as documented above.  This accurately captures the substance of my conversation with the patient.  Provider location: [place] ([facility]/State)  Patient location: home    Thank you for your referral.    INQUIRIES  Therapist: Melva Hines PT  [facility]  [address] [place] [address] [id number]  [facility] [address]  Phone: [phone]  Fax: [phone]

## 2020-05-01 ENCOUNTER — VIRTUAL VISIT (OUTPATIENT)
Dept: PHYSICAL THERAPY | Facility: CLINIC | Age: 20
End: 2020-05-01
Payer: COMMERCIAL

## 2020-05-01 DIAGNOSIS — M62.89 PELVIC FLOOR DYSFUNCTION: Primary | ICD-10-CM

## 2020-05-01 PROCEDURE — 97530 THERAPEUTIC ACTIVITIES: CPT | Mod: 95 | Performed by: PHYSICAL THERAPIST

## 2020-05-01 NOTE — PROGRESS NOTES
"Physical Therapy Virtual Follow Up Visit      The patient has been notified of following:     \"This virtual visit will be conducted between you and your provider. We have found that certain health care needs can be provided without the need for physical presence.  This service lets us provide the care you need with a virtual visit.\"    Due to external, as well as internal [facility] management of the COVID-19 Virus, Massiel Salvador was not seen in our clinic.  As a substitution, we implemented a virtual visit to manage this patient's condition utilizing the PTRx virtual visit platform via the patient s existing code.  The provider, Melva Hines, reviewed the patient's chart, PTRx prescription, and spoke with the patient to determine the following telemedicine visit is appropriate and effective for the patient's care.    The following type of visit was completed:   Video Visit:  The PTRx platform uses a synchronous HIPAA compliant video stream for this patient encounter.        S: Massiel Salvador is a 19 year old female. Connected virtually on the PTRx platform to discuss their condition/progress. They noted improvements in lower back pain and pelvic floor muscle tightness.  They noted ongoing pain or limitations with upper cervical/neck pain and soreness in low back.   Pt feels confident with continuing her HEP and would like a courtesy check in call in a 3-4 weeks.  Current pain level: 2/10      O: Patient demonstrated able to tolerated 6th largest size out of 8 dilator and plans to use the second to largest size today since having no discomfort with size 6.     PTRx Content from today's visit:    Therapeutic Activity:  Verbally reviewed all fours cat/cow stretch to perform and transverse abdominis activation in table top position.    Instructed pt to perform forward folds with over pressure every 3-4 hours. Educated pt on importance of good sitting posture and to prop her computer up on a few books so " that it is at eye level to reduce neck pain and forward head posture.    A:   Patient's symptoms are resolving.  Response to therapy has shown an improvement in  pain level and coordination      P: Patient will continue with the exercise program assigned on their PTRx code and will add the following measures to manage their pain/condition: cat/cow after tra activation, good seated posture     Current treatment program is being advanced to more complex exercises.      Virtual visit contact time    Time of service began: 8:20 AM  Time of service ended: 8:36 AM  Total Time for set up, visit, and documentation: 16 minutes    Payor: DINO / Plan: DINO [facility] / Product Type: Indemnity /   .  Procedure Code/s   Therapeutic Activities (14075): 16 minutes    I have reviewed the note as documented above.  This accurately captures the substance of my conversation with the patient.  Provider location: [place] ([facility])  Patient location: home

## 2020-05-28 ENCOUNTER — VIRTUAL VISIT (OUTPATIENT)
Dept: DERMATOLOGY | Facility: CLINIC | Age: 20
End: 2020-05-28
Payer: COMMERCIAL

## 2020-05-28 DIAGNOSIS — L70.0 ACNE VULGARIS: ICD-10-CM

## 2020-05-28 RX ORDER — CLINDAMYCIN PHOSPHATE 10 UG/ML
LOTION TOPICAL 2 TIMES DAILY
Qty: 60 ML | Refills: 1 | Status: SHIPPED | OUTPATIENT
Start: 2020-05-28 | End: 2020-10-14

## 2020-05-28 RX ORDER — TRETINOIN 0.5 MG/G
CREAM TOPICAL AT BEDTIME
Qty: 45 G | Refills: 1 | Status: SHIPPED | OUTPATIENT
Start: 2020-05-28 | End: 2020-10-14

## 2020-05-28 RX ORDER — SPIRONOLACTONE 50 MG/1
150 TABLET, FILM COATED ORAL DAILY
Qty: 90 TABLET | Refills: 3 | Status: SHIPPED | OUTPATIENT
Start: 2020-05-28 | End: 2020-10-12

## 2020-05-28 ASSESSMENT — PAIN SCALES - GENERAL: PAINLEVEL: NO PAIN (0)

## 2020-05-28 NOTE — NURSING NOTE
Dermatology Rooming Note    Massiel Salvador's goals for this visit include:   Chief Complaint   Patient presents with     Acne     Massiel is following up on acne.      KATHRINE Majano

## 2020-05-28 NOTE — LETTER
5/28/2020       RE: Massiel Salvador  [address] [address]     Dear Colleague,    Thank you for referring your patient, Massiel Salvador, to the [facility] DERMATOLOGY at [facility]. Please see a copy of my visit note below.    [facility]  Video: ( Invitation sent by:  [employer] and text to cell phone ) [phone]      Impression and Recommendations (Patient Counseled on the Following):  1.Acne Vulgaris, hormonal component  -Continue spironolactone, but increase to 150mg po QD for now - advised it may take some time for this higher dose to take an effect. Continue to monitor for changes such as breast tenderness, menstrual changes and heart palpitations/dizziness.   -Continue with BPO cleanser BID  -Continue clindamycin 1% lotion qAM  -Start tretinoin 0.05% cream QPM - edu on dryness  -continue with sun protection  -Continue with lexapro daily  -Will reassess need and appropriate use of isotretinoin at next visit, will want approval from patient's mental health provider regarding use of isotretinoin    Follow-up:   Follow-up with dermatology in approximately 3mo. Earlier for new or changing lesions or rash.   CC Dr. Nina on close of this encounter     Staff only:    All risks, benefits and alternatives were discussed with patient.  Patient is in agreement and understands the assessment and plan.  All questions were answered.    Ryann Weber PA-C, MPAS  [facility] [place]: Phone: [phone], Fax: [phone]  [facility]: Phone: [phone],  Fax: [phone]  _____________________________________________________________________________    Dermatology Problem List:  1. Acne Vulgaris   -Current tx: Clindamycin 1% lotion, tretinoin 0.05% cream, spironolactone 50 mg TID and BPO cleanser  -Previous tx: doxycycline 100mg po BID, adapalene 0.1% cream,  sulfacetamide sodium 10% lotion, spironolactone 50mg every day as well as trial on BID, accutane 40 mg every day - discontinued after 2 weeks due to mood/anxiety changes    Encounter Date: May 28, 2020    CC:   Chief Complaint   Patient presents with     Acne     Massiel is following up on acne.        History of Present Illness:  I have reviewed the teledermatology information and the nursing intake corresponding to this issue. Massiel Salvador is a 19 year old female who presents via teledermatology for an acne follow-up. She notes her skin is a little dry. Acne has improved, espcically on the chin. She notes that is pretty much gone. She still gets some breakouts on the cheeks, but the tretinoin was helping. Still using clindamycin 1% loition in the morning as well as BPO cleanser in the morning. Some mild breast tenderness, but this is tolerable. Some increase in urination intially, but this has gotten better. Mood is feeling much better, lexapro is helping she says. She wants to avoid accutane right now because mood is doing so well. Denies menstrual irregularities, mild breast tenderness, or excessive urination. The patient is using contraception, she has an IUD.     ROS: Patient is generally feeling well today   -GI: The patient denies nausea, vomiting, diarrhea or abdominal pain.  -: No changes in menstrual cycle, + breast tenderness, no change in urination  -CVS: no palpitations, dizziness  -Constitutional: The patient denies fatigue, fevers, chills, unintended weight loss, and night sweats.  -HEENT: Patient denies nonhealing oral sores.  -Skin: As above in HPI. No additional skin concerns.  -PSYCH: anxiety sx have improved, no SI    Physical Examination:  General: Well-appearing, appropriately-developed individual.  Skin: Focused examination within the teledermatology photograph(s) including head, neck and face was performed.   -overall improved, especially lower face, but with some pink papules bilaterally  on the upper malar cheeks  -hyperpigmented macules noted on the lower face    Past Medical History:   Patient Active Problem List   Diagnosis     Dyspareunia, female     No past medical history on file.  No past surgical history on file.    Social History:  Patient reports that she has never smoked. She has never used smokeless tobacco.    Family History:  Family History   Problem Relation Age of Onset     Melanoma No family hx of      Skin Cancer No family hx of        Medications:  Current Outpatient Medications   Medication     clindamycin (CLEOCIN T) 1 % external lotion     escitalopram (LEXAPRO) 5 MG tablet     spironolactone (ALDACTONE) 50 MG tablet     tretinoin (RETIN-A) 0.05 % external cream     No current facility-administered medications for this visit.         Allergies   Allergen Reactions     Shellfish-Derived Products      _____________________________________________________________________________    Teledermatology information:  - Location of patient in [place]: Home  - Patient presented as: return  - Location of teledermatologist:  ([facility] DERMATOLOGY )  - Reason teledermatology is appropriate:  of National Emergency Regarding Coronavirus disease (COVID 19) Outbreak  - Image quality and interpretability: acceptable  - Physician has received verbal consent for a Video/Photos Visit from the patient? Yes  - In-person dermatology visit recommendation: no  - Date of images: 5/28/20  - Length of call 11min  - Date of report: 5/28/2020     Again, thank you for allowing me to participate in the care of your patient.      Sincerely,    Ryann Weber PA-C

## 2020-05-28 NOTE — PROGRESS NOTES
[facility]:  Video: ( Invitation sent by:  [employer] and text to cell phone ) [phone]      Impression and Recommendations (Patient Counseled on the Following):  1.Acne Vulgaris, hormonal component  -Continue spironolactone, but increase to 150mg po QD for now - advised it may take some time for this higher dose to take an effect. Continue to monitor for changes such as breast tenderness, menstrual changes and heart palpitations/dizziness.   -Continue with BPO cleanser BID  -Continue clindamycin 1% lotion qAM  -Start tretinoin 0.05% cream QPM - edu on dryness  -continue with sun protection  -Continue with lexapro daily  -Will reassess need and appropriate use of isotretinoin at next visit, will want approval from patient's mental health provider regarding use of isotretinoin    Follow-up:   Follow-up with dermatology in approximately 3mo. Earlier for new or changing lesions or rash.   CC Dr. Nina on close of this encounter     Staff only:    All risks, benefits and alternatives were discussed with patient.  Patient is in agreement and understands the assessment and plan.  All questions were answered.    Ryann Weber PA-C, MPAS  [facility] [place]: Phone: [phone], Fax: [phone]  [facility]: Phone: [phone],  Fax: [phone]  _____________________________________________________________________________    Dermatology Problem List:  1. Acne Vulgaris   -Current tx: Clindamycin 1% lotion, tretinoin 0.05% cream, spironolactone 50 mg TID and BPO cleanser  -Previous tx: doxycycline 100mg po BID, adapalene 0.1% cream, sulfacetamide sodium 10% lotion, spironolactone 50mg every day as well as trial on BID, accutane 40 mg every day - discontinued after 2 weeks due to mood/anxiety changes    Encounter Date: May 28, 2020    CC:   Chief Complaint   Patient presents with     Acne     Massiel is following up  on acne.        History of Present Illness:  I have reviewed the teledermatology information and the nursing intake corresponding to this issue. Massiel Salvador is a 19 year old female who presents via teledermatology for an acne follow-up. She notes her skin is a little dry. Acne has improved, espcically on the chin. She notes that is pretty much gone. She still gets some breakouts on the cheeks, but the tretinoin was helping. Still using clindamycin 1% loition in the morning as well as BPO cleanser in the morning. Some mild breast tenderness, but this is tolerable. Some increase in urination intially, but this has gotten better. Mood is feeling much better, lexapro is helping she says. She wants to avoid accutane right now because mood is doing so well. Denies menstrual irregularities, mild breast tenderness, or excessive urination. The patient is using contraception, she has an IUD.     ROS: Patient is generally feeling well today   -GI: The patient denies nausea, vomiting, diarrhea or abdominal pain.  -: No changes in menstrual cycle, + breast tenderness, no change in urination  -CVS: no palpitations, dizziness  -Constitutional: The patient denies fatigue, fevers, chills, unintended weight loss, and night sweats.  -HEENT: Patient denies nonhealing oral sores.  -Skin: As above in HPI. No additional skin concerns.  -PSYCH: anxiety sx have improved, no SI    Physical Examination:  General: Well-appearing, appropriately-developed individual.  Skin: Focused examination within the teledermatology photograph(s) including head, neck and face was performed.   -overall improved, especially lower face, but with some pink papules bilaterally on the upper malar cheeks  -hyperpigmented macules noted on the lower face    Past Medical History:   Patient Active Problem List   Diagnosis     Dyspareunia, female     No past medical history on file.  No past surgical history on file.    Social History:  Patient reports that she has  never smoked. She has never used smokeless tobacco.    Family History:  Family History   Problem Relation Age of Onset     Melanoma No family hx of      Skin Cancer No family hx of        Medications:  Current Outpatient Medications   Medication     clindamycin (CLEOCIN T) 1 % external lotion     escitalopram (LEXAPRO) 5 MG tablet     spironolactone (ALDACTONE) 50 MG tablet     tretinoin (RETIN-A) 0.05 % external cream     No current facility-administered medications for this visit.         Allergies   Allergen Reactions     Shellfish-Derived Products      _____________________________________________________________________________    Teledermatology information:  - Location of patient in [place]: Home  - Patient presented as: return  - Location of teledermatologist:  [facility] DERMATOLOGY )  - Reason teledermatology is appropriate:  of National Emergency Regarding Coronavirus disease (COVID 19) Outbreak  - Image quality and interpretability: acceptable  - Physician has received verbal consent for a Video/Photos Visit from the patient? Yes  - In-person dermatology visit recommendation: no  - Date of images: 5/28/20  - Length of call 11min  - Date of report: 5/28/2020

## 2020-05-28 NOTE — PATIENT INSTRUCTIONS
[facility] Teledermatology Visit    Thank you for allowing us to participate in your care. Your findings, instructions and follow-up plan are as follows:    Acne  -continue with current treatment plan  -increase spironolactone to 50mg three times per day (or 150mg at once)    When should I call my doctor?    If you are worsening or not improving, please, contact us or seek urgent care as noted below.     Who should I call with questions (adults)?    [facility] (adult and pediatric): [phone]     [facility] (adult): [phone]    For urgent needs outside of business hours call the [facility] at [phone] and ask for the dermatology resident on call    If this is a medical emergency and you are unable to reach an ER, Call [phone]      Who should I call with questions (pediatric)?  [facility]- Pediatric Dermatology  Dr. Mable Austin, Dr. Nico Rojas, Dr. Oriana Rod, Brianda Bal, ALONZO Blackburn, Dr. Jeannine Nina & Dr. Suman Dutton  Non Urgent  Nurse Triage Line; [phone]- Lynsey and Lyssa RN Care Coordinators   Analia (/Complex ) [phone]    If you need a prescription refill, please contact your pharmacy. Refills are approved or denied by our Physicians during normal business hours, Monday through Fridays  Per office policy, refills will not be granted if you have not been seen within the past year (or sooner depending on your child's condition)    Scheduling Information:  Pediatric Appointment Scheduling and Call Center [phone]  Radiology Scheduling- [phone]  Sedation Unit Scheduling- [phone]  [place] Scheduling- General [phone]; Pediatric Dermatology [phone]  Main  Services: [phone]  Arabic: [phone]  Mauritanian: [phone]  Hmong/Welsh/Hebrew: [phone]  Preadmission Nursing Department Fax  Number: [phone] (Fax all pre-operative paperwork to this number)    For urgent matters arising during evenings, weekends, or holidays that cannot wait for normal business hours please call [phone] and ask for the Dermatology Resident On-Call to be paged.

## 2020-07-08 PROBLEM — N94.10 DYSPAREUNIA, FEMALE: Status: RESOLVED | Noted: 2020-03-04 | Resolved: 2020-07-08

## 2020-07-08 NOTE — PROGRESS NOTES
Discharge Note    Progress reporting period is from initial evaluation date (please see noted date below) to May 1, 2020.  Linked Episodes   Type: Episode: Status: Noted: Resolved: Last update: Updated by:   PHYSICAL THERAPY pelvic floor Active 3/4/2020  5/1/2020 10:00 AM Melva Hines, CHRIS      Comments:       Massiel failed to follow up and current status is unknown.  Please see information below for last relevant information on current status.  Patient seen for 5 visits.    SUBJECTIVE  Subjective changes noted by patient:     .  Current pain level is  .     Previous pain level was  1/10.   Changes in function:  Yes (See Goal flowsheet attached for changes in current functional level)  Adverse reaction to treatment or activity: None    OBJECTIVE  Changes noted in objective findings:       ASSESSMENT/PLAN  Diagnosis: pelvic floor dysfunction   Updated problem list and treatment plan:   Pain - HEP  Decreased ROM/flexibility - HEP  Impaired muscle performance - HEP  STG/LTGs have been met or progress has been made towards goals:  Yes, please see goal flowsheet for most current information  Assessment of Progress: current status is unknown.    Last current status:     Self Management Plans:  HEP  I have re-evaluated this patient and find that the nature, scope, duration and intensity of the therapy is appropriate for the medical condition of the patient.  Massiel continues to require the following intervention to meet STG and LTG's:  HEP.    Recommendations:  Discharge with current home program.  Patient to follow up with MD as needed.    Please refer to the daily flowsheet for treatment today, total treatment time and time spent performing 1:1 timed codes.

## 2020-08-18 ENCOUNTER — TELEPHONE (OUTPATIENT)
Dept: DERMATOLOGY | Facility: CLINIC | Age: 20
End: 2020-08-18

## 2020-08-18 NOTE — TELEPHONE ENCOUNTER
Called pt and LVM. I let her know we need to change her 8/27/20 appointment to a virtual visit. Or she can reschedule. I mentioned if we do not hear back from her in the next 72 hours we will be canceling this appointment.  Zaida Cosby, KATHRINE

## 2020-09-14 ENCOUNTER — TELEPHONE (OUTPATIENT)
Dept: DERMATOLOGY | Facility: CLINIC | Age: 20
End: 2020-09-14

## 2020-09-14 NOTE — TELEPHONE ENCOUNTER
"Teledermatology Nurse Call for RETURN patients seen within the last 3 years:      The patient was contacted by phone and we reviewed they have a visit in teledermatology upcoming with an MD or CLOVIS;  Importantly, \"a teledermatology visit may not be as thorough as an in-person visit, and the quality of the photograph and/or video sent may not be of the same quality as that taken by the dermatology clinic.\"     We have found that certain health care needs can be provided without the need for an in-person physical exam.   If a prescription is necessary we can send it directly to your pharmacy.  If lab work is needed we can place an order for that and you can then stop by our lab to have the test done at a later time.Visits are billed at different rates depending on your insurance coverage. Please reach out to your insurance provider with any questions.    The patient chose to:                                                                                                                                                                                                                 The patient selected [employer]. This video visit will be conducted via a call between you and your physician/provider via [email or web address]. We reviewed that certain health care needs can be provided without the need for an in-person physical exam.  This service lets us provide the care you need with a video conversation. They verified they will be in the state of [place] at the time of the encounter.                  The patient denied skin pain, fever, mucosal symptoms(lesions, blisters, sores in the mouth, nose, eyes, or genitals)  IF PATIENT ENDORSES ANY OF THESE STOP AND PAGE ON CALL ATTENDING                                                                                                                                                                                                                               "

## 2020-10-05 ENCOUNTER — VIRTUAL VISIT (OUTPATIENT)
Dept: DERMATOLOGY | Facility: CLINIC | Age: 20
End: 2020-10-05
Payer: COMMERCIAL

## 2020-10-05 DIAGNOSIS — L70.0 ACNE VULGARIS: ICD-10-CM

## 2020-10-05 PROCEDURE — 99213 OFFICE O/P EST LOW 20 MIN: CPT | Mod: 95 | Performed by: PHYSICIAN ASSISTANT

## 2020-10-05 ASSESSMENT — PAIN SCALES - GENERAL: PAINLEVEL: NO PAIN (0)

## 2020-10-05 NOTE — LETTER
"10/5/2020       RE: Massiel Salvador  [address]  [place] [address]     Dear Colleague,    Thank you for referring your patient, Massiel Salvador, to the [facility] DERMATOLOGY CLINIC [place] at [facility]. Please see a copy of my visit note below.    [facility] Dermatology Record:  Store and Forward and Video ( Invitation sent by:  [employer] waiting room )      Dermatology Problem List:  1. Acne Vulgaris   -Current tx: Clindamycin 1% lotion, tretinoin 0.05% cream, spironolactone 50 mg TID and BPO cleanser  -Previous tx: doxycycline 100mg po BID, adapalene 0.1% cream, sulfacetamide sodium 10% lotion, spironolactone 50mg every day as well as trial on BID, accutane 40 mg every day - discontinued after 2 weeks due to mood/anxiety changes    Encounter Date: Oct 5, 2020    CC:   Chief Complaint   Patient presents with     Acne     Massiel is following up on acne.        History of Present Illness:  Massiel Salvador is a 19 year old female who presents for an acne follow up. She feels like her lower face is flaring a lot. She has attributed this to her mask, but says she is also not sure.Currently taking spironolactone 100mg daily - not 150mg as we previously discussed. Still using tretinoin and clindamycin. Skin \"blew up\" in august. She did go on minocycline for a bit which her PCP prescribed her at home. She does use a BPO cleanser in the morning. Notes an increase in urination, but is otherwise tolerating the medication well. Denies menstrual irregularities, breast tenderness, or excessive urination. The patient is not using contraception - we reviewed this risk, she is not currently sexually active.     ROS: Patient is generally feeling well today   -GI: The patient denies nausea, vomiting, diarrhea or abdominal pain.  -: No changes in menstrual cycle, no breast tenderness, no change in urination  -CVS: no palpitations, dizziness  -Constitutional: no unintended weight " loss/gain, no night sweats or fevers  -Skin: as per HPI    Physical Examination:  General: Well-appearing, appropriately-developed individual.  Skin: Focused examination including face was performed.   -There are acneiform papules with intermixed open and closed comedones on the lower face, flare from previously.       Labs:  n/a    Past Medical History:   Patient Active Problem List   Diagnosis   (none) - all problems resolved or deleted     No past medical history on file.  No past surgical history on file.    Social History:  Patient reports that she has never smoked. She has never used smokeless tobacco.    Family History:  Family History   Problem Relation Age of Onset     Melanoma No family hx of      Skin Cancer No family hx of        Medications:  Current Outpatient Medications   Medication     clindamycin (CLEOCIN T) 1 % external lotion     escitalopram (LEXAPRO) 5 MG tablet     spironolactone (ALDACTONE) 50 MG tablet     tretinoin (RETIN-A) 0.05 % external cream     No current facility-administered medications for this visit.        Allergies   Allergen Reactions     Shellfish-Derived Products      Impression and Recommendations (Patient Counseled on the Following):  1.Acne Vulgaris, hormonal component  -Continue spironolactone, but increase to 150mg po QD for now - advised it may take some time for this higher dose to take an effect. Continue to monitor for changes such as breast tenderness, menstrual changes and heart palpitations/dizziness.   -Continue with BPO cleanser BID  -Continue clindamycin 1% lotion qAM  -continuet tretinoin 0.05% cream QPM - edu on dryness  -continue with sun protection  -Continue with lexapro daily  -Will reassess need and appropriate use of isotretinoin at next visit, will want approval from patient's mental health provider regarding use of isotretinoin      Follow-up:   Follow-up with dermatology in approximately 3mo. Earlier for new or changing lesions or rash.   CC   [place] on close of this encounter.     Staff only    All risks, benefits and alternatives were discussed with patient.  Patient is in agreement and understands the assessment and plan.  All questions were answered.    Ryann Weber PA-C, MPAS  [facility] [place]: Phone: [phone], Fax: [phone]  [facility]: Phone: [phone],  Fax: [phone]  _____________________________________________________________________________    Teledermatology information:  - Location of patient: [place]  - Patient presented as: return  - Location of teledermatologist:  ([facility] DERMATOLOGY CLINIC [place] )  - Reason teledermatology is appropriate:  of National Emergency Regarding Coronavirus disease (COVID 19) Outbreak  - Image quality and interpretability: acceptable  - Physician has received verbal consent for a Video/Photos Visit from the patient? Yes  - In-person dermatology visit recommendation: no  - Date of images: 9/30/20  - Length of call: 10min  - Date of report: 10/5/2020

## 2020-10-05 NOTE — PATIENT INSTRUCTIONS
[facility] Dermatology Visit    Thank you for allowing us to participate in your care. Your findings, instructions and follow-up plan are as follows:  1.Acne Vulgaris, hormonal component  -Continue spironolactone, but increase to 150mg daily for now - advised it may take some time for this higher dose to take an effect. Continue to monitor for changes such as breast tenderness, menstrual changes and heart palpitations/dizziness.   -Continue with BPO cleanser twice daily  -Continue clindamycin 1% lotion in the morning  -continuet tretinoin 0.05% cream in the evening - edu on dryness  -continue with sun protection  -Continue with lexapro daily  -Will reassess need and appropriate use of isotretinoin at next visit, will want approval from patient's mental health provider regarding use of isotretinoin    When should I call my doctor?    If you are worsening or not improving, please, contact us or seek urgent care as noted below.     Who should I call with questions (adults)?    [facility] (adult and pediatric): [phone]     [facility] (adult): [phone]    For urgent needs outside of business hours call the [facility] at [phone] and ask for the dermatology resident on call    If this is a medical emergency and you are unable to reach an ER, Call 1      Who should I call with questions (pediatric)?  [facility]- Pediatric Dermatology  Dr. Mable Austin, Dr. Nico Rojas, Dr. Oriana Rod, Brianda [facility], [place]  Dr. Earlene Blackburn, Dr. Jeannine Nina & Dr. Suman Dutton  Non Urgent  Nurse Triage Line; [phone]- Lynsey and Lyssa WARD Care Coordinators   [place] (/Complex ) [phone]    If you need a prescription refill, please contact your pharmacy. Refills are approved or denied by our Physicians during normal business hours, Monday through Fridays  Per office  policy, refills will not be granted if you have not been seen within the past year (or sooner depending on your child's condition)    Scheduling Information:  Pediatric Appointment Scheduling and Call Center [phone]  Radiology Scheduling- [phone]  Sedation Unit Scheduling- [phone]  [place] Scheduling- General [phone]; Pediatric Dermatology [phone]  Main  Services: [phone]  Khmer: [phone]  Gibraltarian: [phone]  Hmong/Latvian/Citizen of the [place]: [phone]  Preadmission Nursing Department Fax Number: [phone] (Fax all pre-operative paperwork to this number)    For urgent matters arising during evenings, weekends, or holidays that cannot wait for normal business hours please call [phone] and ask for the Dermatology Resident On-Call to be paged.

## 2020-10-05 NOTE — PROGRESS NOTES
"[facility] Dermatology Record:  Store and Forward and Video ( Invitation sent by:  [facility] waiting room )      Dermatology Problem List:  1. Acne Vulgaris   -Current tx: Clindamycin 1% lotion, tretinoin 0.05% cream, spironolactone 50 mg TID and BPO cleanser  -Previous tx: doxycycline 100mg po BID, adapalene 0.1% cream, sulfacetamide sodium 10% lotion, spironolactone 50mg every day as well as trial on BID, accutane 40 mg every day - discontinued after 2 weeks due to mood/anxiety changes    Encounter Date: Oct 5, 2020    CC:   Chief Complaint   Patient presents with     Acne     Massiel is following up on acne.        History of Present Illness:  Massiel Salvador is a 19 year old female who presents for an acne follow up. She feels like her lower face is flaring a lot. She has attributed this to her mask, but says she is also not sure.Currently taking spironolactone 100mg daily - not 150mg as we previously discussed. Still using tretinoin and clindamycin. Skin \"blew up\" in august. She did go on minocycline for a bit which her PCP prescribed her at home. She does use a BPO cleanser in the morning. Notes an increase in urination, but is otherwise tolerating the medication well. Denies menstrual irregularities, breast tenderness, or excessive urination. The patient is not using contraception - we reviewed this risk, she is not currently sexually active.     ROS: Patient is generally feeling well today   -GI: The patient denies nausea, vomiting, diarrhea or abdominal pain.  -: No changes in menstrual cycle, no breast tenderness, no change in urination  -CVS: no palpitations, dizziness  -Constitutional: no unintended weight loss/gain, no night sweats or fevers  -Skin: as per HPI    Physical Examination:  General: Well-appearing, appropriately-developed individual.  Skin: Focused examination including face was performed.   -There are acneiform papules with intermixed open and closed comedones on the lower face, flare from " previously.       Labs:  n/a    Past Medical History:   Patient Active Problem List   Diagnosis   (none) - all problems resolved or deleted     No past medical history on file.  No past surgical history on file.    Social History:  Patient reports that she has never smoked. She has never used smokeless tobacco.    Family History:  Family History   Problem Relation Age of Onset     Melanoma No family hx of      Skin Cancer No family hx of        Medications:  Current Outpatient Medications   Medication     clindamycin (CLEOCIN T) 1 % external lotion     escitalopram (LEXAPRO) 5 MG tablet     spironolactone (ALDACTONE) 50 MG tablet     tretinoin (RETIN-A) 0.05 % external cream     No current facility-administered medications for this visit.        Allergies   Allergen Reactions     Shellfish-Derived Products      Impression and Recommendations (Patient Counseled on the Following):  1.Acne Vulgaris, hormonal component  -Continue spironolactone, but increase to 150mg po QD for now - advised it may take some time for this higher dose to take an effect. Continue to monitor for changes such as breast tenderness, menstrual changes and heart palpitations/dizziness.   -Continue with BPO cleanser BID  -Continue clindamycin 1% lotion qAM  -continuet tretinoin 0.05% cream QPM - edu on dryness  -continue with sun protection  -Continue with lexapro daily  -Will reassess need and appropriate use of isotretinoin at next visit, will want approval from patient's mental health provider regarding use of isotretinoin      Follow-up:   Follow-up with dermatology in approximately 3mo. Earlier for new or changing lesions or rash.   CC Dr. Caceres on close of this encounter.     Staff only    All risks, benefits and alternatives were discussed with patient.  Patient is in agreement and understands the assessment and plan.  All questions were answered.    Ryann Weber PA-C, MPAS  [facility]  [facility]: Phone: [phone], Fax: [phone]  [facility]: Phone: [phone],  Fax: [phone]  _____________________________________________________________________________    Teledermatology information:  - Location of patient: [place]  - Patient presented as: return  - Location of teledermatologist:  ([facility] DERMATOLOGY CLINIC [place] )  - Reason teledermatology is appropriate:  of National Emergency Regarding Coronavirus disease (COVID 19) Outbreak  - Image quality and interpretability: acceptable  - Physician has received verbal consent for a Video/Photos Visit from the patient? Yes  - In-person dermatology visit recommendation: no  - Date of images: 9/30/20  - Length of call: 10min  - Date of report: 10/5/2020

## 2020-10-08 DIAGNOSIS — L70.0 ACNE VULGARIS: ICD-10-CM

## 2020-10-12 RX ORDER — SPIRONOLACTONE 50 MG/1
150 TABLET, FILM COATED ORAL DAILY
Qty: 270 TABLET | Refills: 1 | Status: SHIPPED | OUTPATIENT
Start: 2020-10-12 | End: 2021-05-04

## 2020-10-14 RX ORDER — CLINDAMYCIN PHOSPHATE 10 UG/ML
LOTION TOPICAL 2 TIMES DAILY
Qty: 60 ML | Refills: 1 | Status: SHIPPED | OUTPATIENT
Start: 2020-10-14 | End: 2022-01-20

## 2020-10-14 RX ORDER — TRETINOIN 0.5 MG/G
CREAM TOPICAL AT BEDTIME
Qty: 45 G | Refills: 1 | Status: SHIPPED | OUTPATIENT
Start: 2020-10-14 | End: 2021-10-21

## 2020-10-14 RX ORDER — SPIRONOLACTONE 50 MG/1
150 TABLET, FILM COATED ORAL DAILY
Qty: 90 TABLET | Refills: 3 | Status: SHIPPED | OUTPATIENT
Start: 2020-10-14 | End: 2021-02-15

## 2020-10-15 DIAGNOSIS — Z79.899 ON ISOTRETINOIN THERAPY: Primary | ICD-10-CM

## 2020-11-20 ENCOUNTER — VIRTUAL VISIT (OUTPATIENT)
Dept: DERMATOLOGY | Facility: CLINIC | Age: 20
End: 2020-11-20
Payer: COMMERCIAL

## 2020-11-20 DIAGNOSIS — Z79.899 ON ISOTRETINOIN THERAPY: Primary | ICD-10-CM

## 2020-11-20 DIAGNOSIS — L70.0 ACNE VULGARIS: ICD-10-CM

## 2020-11-20 PROCEDURE — 99213 OFFICE O/P EST LOW 20 MIN: CPT | Mod: 95 | Performed by: PHYSICIAN ASSISTANT

## 2020-11-20 RX ORDER — BUPROPION HYDROCHLORIDE 150 MG/1
150 TABLET ORAL EVERY MORNING
COMMUNITY

## 2020-11-20 NOTE — LETTER
"11/20/2020       RE: Massiel Salvador  [address]  [place] [address]     Dear Colleague,    Thank you for referring your patient, Massiel Salvador, to the [facility] DERMATOLOGY CLINIC [place] at [facility]. Please see a copy of my visit note below.    [facility] Dermatology Record:  Store and Forward and Telephone [phone]      Dermatology Problem List:  1. Acne Vulgaris   -Current tx: Starting isotretinoin next month, Clindamycin 1% lotion, tretinoin 0.05% cream, spironolactone 50 mg TID and BPO cleanser  -Previous tx: doxycycline 100mg po BID, adapalene 0.1% cream, sulfacetamide sodium 10% lotion, spironolactone 50mg every day as well as trial on BID, accutane 40 mg every day - discontinued after 2 weeks due to mood/anxiety changes    Encounter Date: Nov 20, 2020    CC:   Chief Complaint   Patient presents with     Derm Problem     Massiel is having a virtual visit for acne. She states \" my acne is pretty bad right now\"     History of Present Illness:  Massiel Salvador is a 19 year old female who presents for an acne follow up. She is hoping to start isotretinoin next month. She has been following up with her PCP regarding anxiety. We do have verbal clearance from her PCP to start isotretinoin. Patient's anxiety is currently managed on Lexapro. She recently added Wellbutrin for tiredness, and reports things have improved on that front significantly. Massiel reports feeling well. She currently is on spironolactone, clindamycin and tretinoin, but her acne continues to flare significantly. She is otherwise well and has no other concerns.     ROS: Patient is generally feeling well today   -Neuro: no HA or vision changes  -GI: No nausea, blood in stool, diarrhea, hx of IBD  -Psych: no depression/anxiety, mood changes, or sleep problems   -Musculoskeletal: no joint or muscle pain or swelling   -Heme/Lymph: no concerning bumps, no bleeding problems  -Constitutional: no " unintended weight loss/gain, no night sweats or fevers  -Skin: as per HPI    Physical Examination:  General: Well-appearing, appropriately-developed individual.  Skin: Focused examination including head and neck was performed.   -There are acneiform papules with intermixed open and closed comedones on the lower face, flare from previously.     Labs:  CBC, CMP, HcG and lipids ordered    Past Medical History:   Patient Active Problem List   Diagnosis   (none) - all problems resolved or deleted     No past medical history on file.  No past surgical history on file.    Social History:  Patient reports that she has never smoked. She has never used smokeless tobacco.    Family History:  Family History   Problem Relation Age of Onset     Melanoma No family hx of      Skin Cancer No family hx of        Medications:  Current Outpatient Medications   Medication     buPROPion (WELLBUTRIN XL) 150 MG 24 hr tablet     clindamycin (CLEOCIN T) 1 % external lotion     escitalopram (LEXAPRO) 5 MG tablet     spironolactone (ALDACTONE) 50 MG tablet     tretinoin (RETIN-A) 0.05 % external cream     spironolactone (ALDACTONE) 50 MG tablet     No current facility-administered medications for this visit.      Allergies   Allergen Reactions     Shellfish-Derived Products      Impression and Recommendations (Patient Counseled on the Following):  1. Acne Vulgaris    Will start isotretinoin 40mg next month. Goal dose is 150-220mg/kg for this 65.90kg patient.     Method of contraception includes: IUD and condoms    Discussion of the risks and side effects of isotretinoin including but not limited to mucocutaneous dryness, arthralgias, myalgias, depression, suicidal ideation, headache, blurred vision, increase in liver function test and increase in lipids. The iPledge program brochure was provided and the contents discussed with the patient. The patient was counseled that they cannot give blood while on isotretinoin. Advised against tattoos and  waxing. No personal or family history of inflammatory bowel disease or hypertriglyceridemia known to patient. Reviewed need to avoid alcohol on medication. The iPledge program consent was obtained. Patient counseled that if they wear contacts, the eyes may become too dry to tolerate. Recommend follow up with eye doctor if this occurs.      Discussed need for sun protection, at least SPF 30+.    Urine pregnancy test obtained     Baseline labs including qualitative hCG, CBC, BUN/Cr, fasting lipids and AST/ALT will be obtained.     Patient's iPledge # is [id number].     The patient will stop all other acne medications next month.    Total dose: 0mg/kg    Follow-up:   Follow-up with dermatology in approximately 1mo. Earlier for new or changing lesions or rash.   CC Dr. Caceres on close of this encounter.     Staff only    All risks, benefits and alternatives were discussed with patient.  Patient is in agreement and understands the assessment and plan.  All questions were answered.    Ryann Weber PA-C, MPAS  [facility]: Phone: [phone], Fax: [phone]  [facility]: Phone: [phone],  Fax: [phone]  _____________________________________________________________________________    Teledermatology information:  - Location of patient: [place]  - Patient presented as: return  - Location of teledermatologist:  ([facility] DERMATOLOGY CLINIC [place] )  - Image quality and interpretability: acceptable  - Physician has received verbal consent for a Video/Photos Visit from the patient? YES  - In-person dermatology visit recommendation: no  - Date of images: 11/20/20  - Length of call: 7min  - Date of report: 11/20/2020

## 2020-11-20 NOTE — NURSING NOTE
"Chief Complaint   Patient presents with     Derm Problem     Massiel is having a virtual visit for acne. She states \" my acne is pretty bad right now\"     Jeannine Caraballo, RMA  "

## 2020-11-20 NOTE — PROGRESS NOTES
"[email or web address] Dermatology Record:  Store and Forward and Telephone [phone]      Dermatology Problem List:  1. Acne Vulgaris   -Current tx: Starting isotretinoin next month, Clindamycin 1% lotion, tretinoin 0.05% cream, spironolactone 50 mg TID and BPO cleanser  -Previous tx: doxycycline 100mg po BID, adapalene 0.1% cream, sulfacetamide sodium 10% lotion, spironolactone 50mg every day as well as trial on BID, accutane 40 mg every day - discontinued after 2 weeks due to mood/anxiety changes    Encounter Date: Nov 20, 2020    CC:   Chief Complaint   Patient presents with     Derm Problem     Massiel is having a virtual visit for acne. She states \" my acne is pretty bad right now\"     History of Present Illness:  Massiel Salvador is a 19 year old female who presents for an acne follow up. She is hoping to start isotretinoin next month. She has been following up with her PCP regarding anxiety. We do have verbal clearance from her PCP to start isotretinoin. Patient's anxiety is currently managed on Lexapro. She recently added Wellbutrin for tiredness, and reports things have improved on that front significantly. Massiel reports feeling well. She currently is on spironolactone, clindamycin and tretinoin, but her acne continues to flare significantly. She is otherwise well and has no other concerns.     ROS: Patient is generally feeling well today   -Neuro: no HA or vision changes  -GI: No nausea, blood in stool, diarrhea, hx of IBD  -Psych: no depression/anxiety, mood changes, or sleep problems   -Musculoskeletal: no joint or muscle pain or swelling   -Heme/Lymph: no concerning bumps, no bleeding problems  -Constitutional: no unintended weight loss/gain, no night sweats or fevers  -Skin: as per HPI    Physical Examination:  General: Well-appearing, appropriately-developed individual.  Skin: Focused examination including head and neck was performed.   -There are acneiform papules with intermixed open and closed comedones on the " lower face, flare from previously.     Labs:  CBC, CMP, HcG and lipids ordered    Past Medical History:   Patient Active Problem List   Diagnosis   (none) - all problems resolved or deleted     No past medical history on file.  No past surgical history on file.    Social History:  Patient reports that she has never smoked. She has never used smokeless tobacco.    Family History:  Family History   Problem Relation Age of Onset     Melanoma No family hx of      Skin Cancer No family hx of        Medications:  Current Outpatient Medications   Medication     buPROPion (WELLBUTRIN XL) 150 MG 24 hr tablet     clindamycin (CLEOCIN T) 1 % external lotion     escitalopram (LEXAPRO) 5 MG tablet     spironolactone (ALDACTONE) 50 MG tablet     tretinoin (RETIN-A) 0.05 % external cream     spironolactone (ALDACTONE) 50 MG tablet     No current facility-administered medications for this visit.      Allergies   Allergen Reactions     Shellfish-Derived Products      Impression and Recommendations (Patient Counseled on the Following):  1. Acne Vulgaris    Will start isotretinoin 40mg next month. Goal dose is 150-220mg/kg for this 65.90kg patient.     Method of contraception includes: IUD and condoms    Discussion of the risks and side effects of isotretinoin including but not limited to mucocutaneous dryness, arthralgias, myalgias, depression, suicidal ideation, headache, blurred vision, increase in liver function test and increase in lipids. The iPledge program brochure was provided and the contents discussed with the patient. The patient was counseled that they cannot give blood while on isotretinoin. Advised against tattoos and waxing. No personal or family history of inflammatory bowel disease or hypertriglyceridemia known to patient. Reviewed need to avoid alcohol on medication. The iPledge program consent was obtained. Patient counseled that if they wear contacts, the eyes may become too dry to tolerate. Recommend follow up  with eye doctor if this occurs.      Discussed need for sun protection, at least SPF 30+.    Urine pregnancy test obtained     Baseline labs including qualitative hCG, CBC, BUN/Cr, fasting lipids and AST/ALT will be obtained.     Patient's iPledge # is [id number].     The patient will stop all other acne medications next month.    Total dose: 0mg/kg    Follow-up:   Follow-up with dermatology in approximately 1mo. Earlier for new or changing lesions or rash.   CC Dr. Caceres on close of this encounter.     Staff only    All risks, benefits and alternatives were discussed with patient.  Patient is in agreement and understands the assessment and plan.  All questions were answered.    Ryann Weber PA-C, MPAS  [facility] [place]: Phone: [phone], Fax: [phone]  [facility]: Phone: [phone],  Fax: [phone]  _____________________________________________________________________________    Teledermatology information:  - Location of patient: [place]  - Patient presented as: return  - Location of teledermatologist:  ([facility] DERMATOLOGY CLINIC [place] )  - Image quality and interpretability: acceptable  - Physician has received verbal consent for a Video/Photos Visit from the patient? YES  - In-person dermatology visit recommendation: no  - Date of images: 11/20/20  - Length of call: 7min  - Date of report: 11/20/2020

## 2020-11-23 DIAGNOSIS — Z79.899 ON ISOTRETINOIN THERAPY: ICD-10-CM

## 2020-11-23 LAB
ALBUMIN SERPL-MCNC: 3.7 G/DL (ref 3.4–5)
ALP SERPL-CCNC: 76 U/L (ref 40–150)
ALT SERPL W P-5'-P-CCNC: 28 U/L (ref 0–50)
ANION GAP SERPL CALCULATED.3IONS-SCNC: 4 MMOL/L (ref 3–14)
AST SERPL W P-5'-P-CCNC: 19 U/L (ref 0–45)
BILIRUB SERPL-MCNC: 0.3 MG/DL (ref 0.2–1.3)
BUN SERPL-MCNC: 10 MG/DL (ref 7–30)
CALCIUM SERPL-MCNC: 9.6 MG/DL (ref 8.5–10.1)
CHLORIDE SERPL-SCNC: 107 MMOL/L (ref 94–109)
CHOLEST SERPL-MCNC: 139 MG/DL
CO2 SERPL-SCNC: 27 MMOL/L (ref 20–32)
CREAT SERPL-MCNC: 0.76 MG/DL (ref 0.52–1.04)
ERYTHROCYTE [DISTWIDTH] IN BLOOD BY AUTOMATED COUNT: 12 % (ref 10–15)
GFR SERPL CREATININE-BSD FRML MDRD: >90 ML/MIN/{1.73_M2}
GLUCOSE SERPL-MCNC: 82 MG/DL (ref 70–99)
HCG UR QL: NEGATIVE
HCT VFR BLD AUTO: 42.6 % (ref 35–47)
HDLC SERPL-MCNC: 40 MG/DL
HGB BLD-MCNC: 14.4 G/DL (ref 11.7–15.7)
LDLC SERPL CALC-MCNC: 82 MG/DL
MCH RBC QN AUTO: 31.4 PG (ref 26.5–33)
MCHC RBC AUTO-ENTMCNC: 33.8 G/DL (ref 31.5–36.5)
MCV RBC AUTO: 93 FL (ref 78–100)
NONHDLC SERPL-MCNC: 99 MG/DL
PLATELET # BLD AUTO: 289 10E9/L (ref 150–450)
POTASSIUM SERPL-SCNC: 4.3 MMOL/L (ref 3.4–5.3)
PROT SERPL-MCNC: 7 G/DL (ref 6.8–8.8)
RBC # BLD AUTO: 4.59 10E12/L (ref 3.8–5.2)
SODIUM SERPL-SCNC: 139 MMOL/L (ref 133–144)
TRIGL SERPL-MCNC: 86 MG/DL
WBC # BLD AUTO: 10.8 10E9/L (ref 4–11)

## 2020-11-23 PROCEDURE — 80061 LIPID PANEL: CPT | Performed by: PATHOLOGY

## 2020-11-23 PROCEDURE — 80053 COMPREHEN METABOLIC PANEL: CPT | Performed by: PATHOLOGY

## 2020-11-23 PROCEDURE — 85027 COMPLETE CBC AUTOMATED: CPT | Performed by: PATHOLOGY

## 2020-11-23 PROCEDURE — 36415 COLL VENOUS BLD VENIPUNCTURE: CPT | Performed by: PATHOLOGY

## 2020-11-23 PROCEDURE — 81025 URINE PREGNANCY TEST: CPT | Performed by: PATHOLOGY

## 2020-11-24 NOTE — NURSING NOTE
Dermatology Rooming Note    Massiel Salvador's goals for this visit include:   Chief Complaint   Patient presents with     Acne     Massiel is following up on acne.      KATHRINE Majano      I apologize for the delay in this response.  This is a GLP-1 receptor agonist medication.  Usually there is one agent that is on formulary and costs less than the others, depending upon his insurance.  He could check with his insurance to see if Byetta, Victoza, Ozempic, or Bydureon are covered at a lower price point.  Byetta is twice daily.  Victoza is daily.  Ozempic and Bydureon are weekly injections.

## 2021-01-03 ENCOUNTER — HEALTH MAINTENANCE LETTER (OUTPATIENT)
Age: 21
End: 2021-01-03

## 2021-02-15 ENCOUNTER — VIRTUAL VISIT (OUTPATIENT)
Dept: DERMATOLOGY | Facility: CLINIC | Age: 21
End: 2021-02-15
Payer: COMMERCIAL

## 2021-02-15 DIAGNOSIS — L70.0 ACNE VULGARIS: Primary | ICD-10-CM

## 2021-02-15 PROCEDURE — 99213 OFFICE O/P EST LOW 20 MIN: CPT | Mod: TEL | Performed by: PHYSICIAN ASSISTANT

## 2021-02-15 NOTE — LETTER
2/15/2021       RE: Massiel Salvador  [address]  [place] [address]     Dear Colleague,    Thank you for referring your patient, Massiel Salvador, to the [facility] DERMATOLOGY CLINIC [place] at [facility]. Please see a copy of my visit note below.    [facility] Dermatology Note  Encounter Date: Feb 15, 2021  Store-and-Forward and Telephone ([phone]). Location of teledermatologist: [facility] DERMATOLOGY CLINIC [place].  Length of call: 6min.    Dermatology Problem List:  1. Acne Vulgaris   -Current tx: continue spironolactone 50mg po BID, tretinoin 0.05% most night,s clinda/BPO gel QAM. We do have verbal clearance from her PCP to start isotretinoin in future if needed. Patient's anxiety is currently managed on Lexapro. She recently added Wellbutrin for tiredness, and reports things have improved on that front significantly.  -Previous tx: accutane 40 mg every day - discontinued after 2 weeks due to mood/anxiety changes, doxycycline 100mg po BID, adapalene 0.1% cream, sulfacetamide sodium 10% lotion  __________________________________________    Assessment & Plan:     # Acne vulgaris. Stable on the following  -continue spironolactone 50mg po BID -CMP nml as of 11/23/20  -continue tretinoin 0.05% cream most nights on clean, dry skin  -Continue BPO gel mixed 1:1 with clindamycin 1% lotion QAM  -can reconsider isotretinoin in future if needed. She does have clearance from PCP to restart isotretinoin despite having mood changes once while on isotretinoin for 2 weeks in the past.     Procedures Performed:    None    Follow-up: 4 month(s) virtually (telephone with photos), or earlier for new or changing lesions    Staff:     All risks, benefits and alternatives were discussed with patient.  Patient is in agreement and understands the assessment and plan.  All questions were answered.    Ryann Weber PA-C, MPAS  [facility]  "[facility]: Phone: [phone], Fax: [phone]  [facility]: Phone: [phone],  Fax: [phone]  ____________________________________________    CC: Derm Problem (Acne - \"It's doing really well.\")    HPI:  Ms. Massiel Salvador is a(n) 20 year old female who presents today as a return patient for acne. On spironolactone 50mg po BID, BPO/clindamycin gel in AM, tretinoin 0.05% most nights. Things have been really calm since last visit.     Plans to start isotreitnoin today. Massiel Salvador is a 19 year old female who presents for an acne follow up. She is hoping to start isotretinoin next month. She has been following up with her PCP regarding anxiety. We do have verbal clearance from her PCP to start isotretinoin. Patient's anxiety is currently managed on Lexapro. She recently added Wellbutrin for tiredness, and reports things have improved on that front significantly. Massiel reports feeling well. She currently is on spironolactone, clindamycin and tretinoin, but her acne continues to flare significantly. She is otherwise well and has no other concerns    Patient is otherwise feeling well, without additional concerns.    Labs:  reviewed form 11/23/20 - [facility]    Physical Exam:  Vitals: There were no vitals taken for this visit.  SKIN: Teledermatology photos were reviewed; image quality and interpretability: acceptable. Image date: see upload date.  -one active papule on the L mid cheek  -scattered erythematous macule son bilateral cheeks  - No other lesions of concern on areas examined.     Medications:  Current Outpatient Medications   Medication     buPROPion (WELLBUTRIN XL) 150 MG 24 hr tablet     clindamycin (CLEOCIN T) 1 % external lotion     escitalopram (LEXAPRO) 5 MG tablet     spironolactone (ALDACTONE) 50 MG tablet     tretinoin (RETIN-A) 0.05 % external cream     No current facility-administered medications for this visit.  "      Past Medical/Surgical History:   Patient Active Problem List   Diagnosis   (none) - all problems resolved or deleted     History reviewed. No pertinent past medical history.    CC Dr. Caceres on close of this encounter.

## 2021-02-15 NOTE — PROGRESS NOTES
"[facility] Dermatology Note  Encounter Date: Feb 15, 2021  Store-and-Forward and Telephone ([phone]). Location of teledermatologist: [facility] DERMATOLOGY CLINIC [place].  Length of call: 6min.    Dermatology Problem List:  1. Acne Vulgaris   -Current tx: continue spironolactone 50mg po BID, tretinoin 0.05% most night,s clinda/BPO gel QAM. We do have verbal clearance from her PCP to start isotretinoin in future if needed. Patient's anxiety is currently managed on Lexapro. She recently added Wellbutrin for tiredness, and reports things have improved on that front significantly.  -Previous tx: accutane 40 mg every day - discontinued after 2 weeks due to mood/anxiety changes, doxycycline 100mg po BID, adapalene 0.1% cream, sulfacetamide sodium 10% lotion  __________________________________________    Assessment & Plan:     # Acne vulgaris. Stable on the following  -continue spironolactone 50mg po BID -CMP nml as of 11/23/20  -continue tretinoin 0.05% cream most nights on clean, dry skin  -Continue BPO gel mixed 1:1 with clindamycin 1% lotion QAM  -can reconsider isotretinoin in future if needed. She does have clearance from PCP to restart isotretinoin despite having mood changes once while on isotretinoin for 2 weeks in the past.     Procedures Performed:    None    Follow-up: 4 month(s) virtually (telephone with photos), or earlier for new or changing lesions    Staff:     All risks, benefits and alternatives were discussed with patient.  Patient is in agreement and understands the assessment and plan.  All questions were answered.    Ryann Weber PA-C, MPAS  [facility] [place]: Phone: [phone], Fax: [phone]  [facility]: Phone: [phone],  Fax: [phone]  ____________________________________________    CC: Derm Problem (Acne - \"It's doing really well.\")    HPI:  MsVazquez Massiel Salvador is " a(n) 20 year old female who presents today as a return patient for acne. On spironolactone 50mg po BID, BPO/clindamycin gel in AM, tretinoin 0.05% most nights. Things have been really calm since last visit.     Plans to start isotreitnoin today. Massiel Salvador is a 19 year old female who presents for an acne follow up. She is hoping to start isotretinoin next month. She has been following up with her PCP regarding anxiety. We do have verbal clearance from her PCP to start isotretinoin. Patient's anxiety is currently managed on Lexapro. She recently added Wellbutrin for tiredness, and reports things have improved on that front significantly. Massiel reports feeling well. She currently is on spironolactone, clindamycin and tretinoin, but her acne continues to flare significantly. She is otherwise well and has no other concerns    Patient is otherwise feeling well, without additional concerns.    Labs:  reviewed form 11/23/20 - [facility]    Physical Exam:  Vitals: There were no vitals taken for this visit.  SKIN: Teledermatology photos were reviewed; image quality and interpretability: acceptable. Image date: see upload date.  -one active papule on the L mid cheek  -scattered erythematous macule son bilateral cheeks  - No other lesions of concern on areas examined.     Medications:  Current Outpatient Medications   Medication     buPROPion (WELLBUTRIN XL) 150 MG 24 hr tablet     clindamycin (CLEOCIN T) 1 % external lotion     escitalopram (LEXAPRO) 5 MG tablet     spironolactone (ALDACTONE) 50 MG tablet     tretinoin (RETIN-A) 0.05 % external cream     No current facility-administered medications for this visit.       Past Medical/Surgical History:   Patient Active Problem List   Diagnosis   (none) - all problems resolved or deleted     History reviewed. No pertinent past medical history.    CC Dr. Caceres on close of this encounter.

## 2021-02-15 NOTE — NURSING NOTE
"Dermatology Rooming Note    Massiel Salvador's goals for this visit include:   Chief Complaint   Patient presents with     Derm Problem     Acne - \"It's doing really well.\"     Mary Meza, [facility]  "

## 2021-05-03 DIAGNOSIS — L70.0 ACNE VULGARIS: ICD-10-CM

## 2021-05-04 RX ORDER — SPIRONOLACTONE 50 MG/1
150 TABLET, FILM COATED ORAL DAILY
Qty: 270 TABLET | Refills: 0 | Status: SHIPPED | OUTPATIENT
Start: 2021-05-04 | End: 2021-05-04

## 2021-05-04 NOTE — TELEPHONE ENCOUNTER
spironolactone (ALDACTONE) 50 MG tablet  Last Written Prescription Date:  10/12/20  Last Fill Quantity: 270,   # refills: 1  Last Office Visit : 2/15/21  Future Office visit: none    Process 2      2/15/21-continue spironolactone 50mg po BID     Routed because: med signed/sent to pharm then noted per note  50 mg bid. cancelled sent order. Please clarify and send correct instructions. thanks.

## 2021-05-05 RX ORDER — SPIRONOLACTONE 50 MG/1
50 TABLET, FILM COATED ORAL 2 TIMES DAILY
Qty: 270 TABLET | Refills: 0 | Status: SHIPPED | OUTPATIENT
Start: 2021-05-05 | End: 2021-09-15

## 2021-06-06 NOTE — TELEPHONE ENCOUNTER
Pt is calling in after just getting back from [place] today. Pt has a sore throat, but denies fever, cough, runny nose, nasal congestion, headache, sinus pressure, difficulty breathing. Pt was referred to [email or web address].   Home care measures discussed, and per protocol pt should be able to monitor these symptoms from home. Pt agrees with plan, and was advised pt to call back if symptoms worsen, or if she has further questions.     Meño Partida RN [facility] Triage/Medication Refill    Reason for Disposition    [1] No CORONAVIRUS EXPOSURE BUT [2] questions about    [1] Sore throat is the only symptom AND [2] sore throat present < 48 hours    Protocols used: CORONAVIRUS (2019-NCOV) EXPOSURE-A-, SORE THROAT-A-AH

## 2021-09-12 DIAGNOSIS — L70.0 ACNE VULGARIS: ICD-10-CM

## 2021-09-15 RX ORDER — SPIRONOLACTONE 50 MG/1
TABLET, FILM COATED ORAL
Qty: 180 TABLET | Refills: 0 | Status: SHIPPED | OUTPATIENT
Start: 2021-09-15 | End: 2021-12-08

## 2021-09-15 NOTE — TELEPHONE ENCOUNTER
"[facility] received refill request for Spironolactone 50mg. Patient chart and attached communication reviewed.    Date of last office/virtual visit: 02/15/2021  RTC appointment date: Not yet scheduled  Drug indication(s): Acne vulgaris  Monitoring labs required? No  LIMITED refill request approved at this time.    Comment/Reasoning:    -Patient needs to be re-established with a provider in dermatology clinic. A juve refill has been provided until the patient can be seen.     Clinic schedulers, please call this patient to schedule a follow up appointment in either myself or Dr. Menjivar's resident continuity clinic.     Per departmental policy, on-call attending provider listed as \"authorizing provider\" for refill of this medication.     Rita Macias MD  PGY-2, Internal Medicine-Dermatology    Attending physician or authorizing PA Cc'd as FYI.     "

## 2021-09-15 NOTE — TELEPHONE ENCOUNTER
SPIRONOLACTONE 50 MG TABLET Take 1 tablet (50 mg) by mouth 2 times daily   Last Written Prescription Date:  5/5/2021  Last Fill Quantity: 270,   # refills: 0  Last Office Visit : 2/15/21  Future Office visit:  None> 4 months  Refill process #2  Routing refill request to provider for review/approval because:  No appt in place, provider out. Bridge refill? See alternate provider?

## 2021-10-15 ENCOUNTER — THERAPY VISIT (OUTPATIENT)
Dept: PHYSICAL THERAPY | Facility: CLINIC | Age: 21
End: 2021-10-15
Payer: COMMERCIAL

## 2021-10-15 DIAGNOSIS — M54.2 NECK PAIN: Primary | ICD-10-CM

## 2021-10-15 PROCEDURE — 97110 THERAPEUTIC EXERCISES: CPT | Mod: GP | Performed by: PHYSICAL THERAPIST

## 2021-10-15 PROCEDURE — 97161 PT EVAL LOW COMPLEX 20 MIN: CPT | Mod: GP | Performed by: PHYSICAL THERAPIST

## 2021-10-15 NOTE — PROGRESS NOTES
Physical Therapy Initial Examination/Evaluation  October 15, 2021    Massiel Salvador is a 20 year old female referred to physical therapy by ALONZO Jade for treatment of cervical spondylosis and headaches with Precautions/Restrictions/MD instructions E&T    Therapist Impression:   Massiel presents to therapy with a chronic history of neck pain and headaches. Demonstrates postural impairments, decreased muscle length, pain with cervical range of motion, trigger points of the upper trap and cervical region, and decreased strength. Skilled therapy is required to address the limitations noted, make progress towards therapy goals, improve quality of life, and return to previous level of function.     Subjective:  DOI/onset: April 2020 DOS: NA  In April of 2020, Massiel's neck started bothering her. She initially tried muscle relaxants which helped somewhat but not long term. Went to a spine specialist and got diagnosed with kyphosis. Trialed a nerve block on the right lower cervical spine which did reduce the frequency of her pain and headaches. Has not done therapy for her neck pain in the past. Does have a history of migraines which resolved a few years ago.   No change in work set up or activity at time of onset. No referring pain or symptoms.   Acute Injury or Gradual Onset?: Gradual injury over time  Mechanism of Injury: unknown  Related PMH: migraines, upper back/shoulder pain Previous Treatment: Pain medications and nerve block Effect of prior treatment: good  Imaging: MRI (cannot see MRI or impression)   Chief Complaint/Functional Limitations:   Pain/headaches and see below in therapy evaluation codes   Pain: rest 5 /10, activity 8/10 Location: right mid neck, radiates up to head Frequency: Intermittent Described as: sharp and shooting Alleviated by: Pain medications, massage and nerve block Progression of Symptoms: Unchanged Time of day when pain is worse: Night  Sleeping: No issues/uninterrupted    Occupation: student  Job duties: prolonged sitting, keyboarding/computer use  Current HEP/exercise regimen: weight lifting, walking, yoga, pilates  Patient's goals are see chief complaints prolonged sitting     Other pertinent PMH/Red Flags: Depression, Mental Illness, Migraines/Headaches   Barriers at home/work: None as reported by patient  Pertinent Surgical History: NA  Medications: Muscle relaxants and Anti-depressants  General health as reported by patient: good  Return to MD:  PRN    Cervical Spine Evaluation    Posture  Forward Head: moderate  Rounded Shoulders: mild  Thoracic Spine: kyphosis noted     Range of Motion (measured in % restriction)  WNL in all directions  Pinching noted with right side bending  Slight irritation with chin tuck    Cervical Strength   Flexion: 5/5  Extension: 5/5  Deep neck flexors: 22/60 sec      Upper Extremity Strength  Shoulder MMT Flexion Scaption ER IR   Left 5/5 5/5 5/5 5/5   Right 5/5 5/5 5/5 5/5     Special Tests  Spurling's: Positive right  Reflexes: NA  Dermatomes: WNL  Myotomes: WNL  Neural tension: NA  Cervical ligaments (Alar and Transverse) screen: WNL  Vertebral artery screen: WNL    Palpation  Tenderness noted on palpation of suboccipitals, right cervical paraspinals, right upper trap, right levator scapulae     Assessment/Plan:  Patient is a 20 year old female with cervical complaints.    Patient has the following significant findings with corresponding treatment plan.                Diagnosis 1:  Neck  Pain -  mechanical traction, manual therapy, splint/taping/bracing/orthotics, self management, education and home program  Decreased ROM/flexibility - manual therapy and therapeutic exercise  Decreased joint mobility - manual therapy and therapeutic exercise  Decreased strength - therapeutic exercise and therapeutic activities  Impaired posture - neuro re-education    Therapy Evaluation Codes:     Cumulative Therapy Evaluation is: Low complexity.    Previous and  current functional limitations:  (See Goal Flow Sheet for this information)    Short term and Long term goals: (See Goal Flow Sheet for this information)     Communication ability:  Patient appears to be able to clearly communicate and understand verbal and written communication and follow directions correctly.  Treatment Explanation - The following has been discussed with the patient:   RX ordered/plan of care  Anticipated outcomes  Possible risks and side effects  This patient would benefit from PT intervention to resume normal activities.   Rehab potential is excellent.    Frequency:  2 X a month, once daily  Duration:  for 3 months  Discharge Plan:  Achieve all LTG.  Independent in home treatment program.  Reach maximal therapeutic benefit.    Please refer to the daily flowsheet for treatment today, total treatment time and time spent performing 1:1 timed codes.     .

## 2021-10-18 DIAGNOSIS — L70.0 ACNE VULGARIS: ICD-10-CM

## 2021-10-21 RX ORDER — TRETINOIN 0.5 MG/G
CREAM TOPICAL AT BEDTIME
Qty: 45 G | Refills: 2 | Status: SHIPPED | OUTPATIENT
Start: 2021-10-21 | End: 2022-01-20

## 2021-10-21 NOTE — TELEPHONE ENCOUNTER
Last Clinic Visit: 2/15/2021 [facility] Dermatology [facility] [place]  Future Visit: 1/20/2022    May refill qty to 12 months from last visit (process #1/Derm protocol).  *future visit 1/20/2022

## 2021-10-29 ENCOUNTER — THERAPY VISIT (OUTPATIENT)
Dept: PHYSICAL THERAPY | Facility: CLINIC | Age: 21
End: 2021-10-29
Payer: COMMERCIAL

## 2021-10-29 DIAGNOSIS — M54.2 NECK PAIN: ICD-10-CM

## 2021-10-29 PROCEDURE — 97110 THERAPEUTIC EXERCISES: CPT | Mod: GP | Performed by: PHYSICAL THERAPIST

## 2021-12-03 ENCOUNTER — THERAPY VISIT (OUTPATIENT)
Dept: PHYSICAL THERAPY | Facility: CLINIC | Age: 21
End: 2021-12-03
Payer: COMMERCIAL

## 2021-12-03 DIAGNOSIS — M54.2 NECK PAIN: ICD-10-CM

## 2021-12-03 PROCEDURE — 97530 THERAPEUTIC ACTIVITIES: CPT | Mod: GP | Performed by: PHYSICAL THERAPIST

## 2021-12-03 PROCEDURE — 97140 MANUAL THERAPY 1/> REGIONS: CPT | Mod: GP | Performed by: PHYSICAL THERAPIST

## 2021-12-04 DIAGNOSIS — L70.0 ACNE VULGARIS: ICD-10-CM

## 2021-12-07 ENCOUNTER — THERAPY VISIT (OUTPATIENT)
Dept: PHYSICAL THERAPY | Facility: CLINIC | Age: 21
End: 2021-12-07
Payer: COMMERCIAL

## 2021-12-07 DIAGNOSIS — M54.2 NECK PAIN: ICD-10-CM

## 2021-12-07 PROCEDURE — 97014 ELECTRIC STIMULATION THERAPY: CPT | Mod: GP | Performed by: PHYSICAL THERAPIST

## 2021-12-07 PROCEDURE — 97140 MANUAL THERAPY 1/> REGIONS: CPT | Mod: GP | Performed by: PHYSICAL THERAPIST

## 2021-12-07 PROCEDURE — 20561 NDL INSJ W/O NJX 3+ MUSC: CPT | Performed by: PHYSICAL THERAPIST

## 2021-12-08 RX ORDER — SPIRONOLACTONE 50 MG/1
TABLET, FILM COATED ORAL
Qty: 180 TABLET | Refills: 0 | Status: SHIPPED | OUTPATIENT
Start: 2021-12-08 | End: 2022-01-20

## 2021-12-14 ENCOUNTER — THERAPY VISIT (OUTPATIENT)
Dept: PHYSICAL THERAPY | Facility: CLINIC | Age: 21
End: 2021-12-14
Payer: COMMERCIAL

## 2021-12-14 DIAGNOSIS — M54.2 NECK PAIN: ICD-10-CM

## 2021-12-14 PROCEDURE — 97140 MANUAL THERAPY 1/> REGIONS: CPT | Mod: GP | Performed by: PHYSICAL THERAPIST

## 2021-12-14 PROCEDURE — 20561 NDL INSJ W/O NJX 3+ MUSC: CPT | Performed by: PHYSICAL THERAPIST

## 2021-12-24 ENCOUNTER — THERAPY VISIT (OUTPATIENT)
Dept: PHYSICAL THERAPY | Facility: CLINIC | Age: 21
End: 2021-12-24
Payer: COMMERCIAL

## 2021-12-24 DIAGNOSIS — M54.2 NECK PAIN: ICD-10-CM

## 2021-12-24 PROCEDURE — 20561 NDL INSJ W/O NJX 3+ MUSC: CPT | Performed by: PHYSICAL THERAPIST

## 2021-12-24 PROCEDURE — 97014 ELECTRIC STIMULATION THERAPY: CPT | Mod: GP | Performed by: PHYSICAL THERAPIST

## 2021-12-24 PROCEDURE — 97140 MANUAL THERAPY 1/> REGIONS: CPT | Mod: GP | Performed by: PHYSICAL THERAPIST

## 2021-12-24 NOTE — PROGRESS NOTES
PROGRESS  REPORT    Progress reporting period is from 10/15/21 to 12/24/21.       SUBJECTIVE  Subjective: Headaches still have not returned.  Dealing with some neck tightness this morning but it's nothing like it used to be    Current Pain level: 0/10.     Initial Pain level: 5/10.   Changes in function:  Yes (See Goal flowsheet attached for changes in current functional level)  Adverse reaction to treatment or activity: None    OBJECTIVE  Objective: Cervical AROM: R rotation: WNL, R SB: very min loss.  Less sensitivity in R UT.  Still has some TTP in R suboccipitals     ASSESSMENT/PLAN  Updated problem list and treatment plan: Diagnosis 1:  Neck pain with headaches  Pain -  electric stimulation, manual therapy, self management, education, home program and dry needling  Decreased ROM/flexibility - manual therapy, therapeutic exercise and home program  Decreased joint mobility - manual therapy, therapeutic exercise and home program  Impaired posture - neuro re-education and home program  STG/LTGs have been met or progress has been made towards goals:  Yes (See Goal flow sheet completed today.)  Assessment of Progress: The patient's condition is improving.  Patient is meeting short term goals and is progressing towards long term goals.  Self Management Plans:  Patient has been instructed in a home treatment program.  Massiel continues to require the following intervention to meet STG and LTG's:  PT    Recommendations:  This patient would benefit from continued therapy.     Frequency:  1 X week, once daily  Duration:  for 4 weeks        Please refer to the daily flowsheet for treatment today, total treatment time and time spent performing 1:1 timed codes.

## 2022-01-04 ENCOUNTER — THERAPY VISIT (OUTPATIENT)
Dept: PHYSICAL THERAPY | Facility: CLINIC | Age: 22
End: 2022-01-04
Payer: COMMERCIAL

## 2022-01-04 DIAGNOSIS — M54.2 NECK PAIN: ICD-10-CM

## 2022-01-04 PROCEDURE — 97014 ELECTRIC STIMULATION THERAPY: CPT | Mod: GP | Performed by: PHYSICAL THERAPIST

## 2022-01-04 PROCEDURE — 97140 MANUAL THERAPY 1/> REGIONS: CPT | Mod: GP | Performed by: PHYSICAL THERAPIST

## 2022-01-04 PROCEDURE — 20561 NDL INSJ W/O NJX 3+ MUSC: CPT | Performed by: PHYSICAL THERAPIST

## 2022-01-04 NOTE — PROGRESS NOTES
"SUBJECTIVE  Subjective: Had one episode where it felt like the original tightness was coming on but it reolved on it's own and the HA never returned.  Has gone several weeks without a HA   Current Pain level: 0/10   Changes in function:  Yes (See Goal flowsheet attached for changes in current functional level)     Adverse reaction to treatment or activity:  None    OBJECTIVE  Objective: Cervical AROM: pt reported a \"pinch\" on the R with end range R SB. (+) Rot-Lat flex for R side.  After manual therapy directed at CTJ and first rib the pinch was gone but there was still some small soft tissue tension     ASSESSMENT  Massiel continues to require intervention to meet STG and LTG's: PT  Patient is progressing as expected.  Response to therapy has shown an improvement in  pain level and ROM   Progress made towards STG/LTG?  Yes (See Goal flowsheet attached for updates on achievement of STG and LTG)    PLAN  Continue current treatment plan until patient demonstrates readiness to progress to higher level exercises.    PTA/ATC plan:  N/A    Please refer to the daily flowsheet for treatment today, total treatment time and time spent performing 1:1 timed codes.      "

## 2022-01-07 ENCOUNTER — THERAPY VISIT (OUTPATIENT)
Dept: PHYSICAL THERAPY | Facility: CLINIC | Age: 22
End: 2022-01-07
Payer: COMMERCIAL

## 2022-01-07 DIAGNOSIS — M54.2 NECK PAIN: ICD-10-CM

## 2022-01-07 PROCEDURE — 97530 THERAPEUTIC ACTIVITIES: CPT | Mod: GP | Performed by: PHYSICAL THERAPIST

## 2022-01-07 PROCEDURE — 97140 MANUAL THERAPY 1/> REGIONS: CPT | Mod: GP | Performed by: PHYSICAL THERAPIST

## 2022-01-13 ENCOUNTER — MYC MEDICAL ADVICE (OUTPATIENT)
Dept: DERMATOLOGY | Facility: CLINIC | Age: 22
End: 2022-01-13
Payer: COMMERCIAL

## 2022-01-20 ENCOUNTER — VIRTUAL VISIT (OUTPATIENT)
Dept: DERMATOLOGY | Facility: CLINIC | Age: 22
End: 2022-01-20
Payer: COMMERCIAL

## 2022-01-20 DIAGNOSIS — L70.0 ACNE VULGARIS: ICD-10-CM

## 2022-01-20 PROCEDURE — 99213 OFFICE O/P EST LOW 20 MIN: CPT | Mod: 95 | Performed by: PHYSICIAN ASSISTANT

## 2022-01-20 RX ORDER — SPIRONOLACTONE 50 MG/1
TABLET, FILM COATED ORAL
Qty: 180 TABLET | Refills: 3 | Status: SHIPPED | OUTPATIENT
Start: 2022-01-20

## 2022-01-20 RX ORDER — CLINDAMYCIN PHOSPHATE 10 UG/ML
LOTION TOPICAL 2 TIMES DAILY
Qty: 60 ML | Refills: 1 | Status: SHIPPED | OUTPATIENT
Start: 2022-01-20

## 2022-01-20 RX ORDER — TRETINOIN 0.5 MG/G
CREAM TOPICAL AT BEDTIME
Qty: 45 G | Refills: 2 | Status: SHIPPED | OUTPATIENT
Start: 2022-01-20

## 2022-01-20 ASSESSMENT — PAIN SCALES - GENERAL: PAINLEVEL: NO PAIN (0)

## 2022-01-20 NOTE — LETTER
1/20/2022       RE: Massiel Salvador  [address] [facility] [address]     Dear Colleague,    Thank you for referring your patient, Massiel Salvador, to the [facility] DERMATOLOGY CLINIC [place] at [facility]. Please see a copy of my visit note below.    [facility] Dermatology Note  Encounter Date: Jan 20, 2022  Telephone ([phone]). Location of teledermatologist: [facility] DERMATOLOGY CLINIC [place]. Start time: 4:45 PM. End time: 4:55 PM.    Dermatology Problem List:  1. Acne Vulgaris   -Current tx: continue spironolactone 50mg po BID, tretinoin 0.05% most nights, clinda/BPO 2.5% gel QAM. We do have verbal clearance from her PCP to start isotretinoin in future if needed. Patient's anxiety is currently managed on Lexapro. She recently added Wellbutrin for tiredness, and reports things have improved on that front significantly.  -Previous tx: accutane 40 mg every day - discontinued after 2 weeks due to mood/anxiety changes, doxycycline 100mg po BID, adapalene 0.1% cream, sulfacetamide sodium 10% lotion  ____________________________________________    Assessment & Plan:     # Acne vulgaris. Clear. Stable on the following  -continue spironolactone 50mg po BID -CMP nml as of 11/23/20  -continue tretinoin 0.05% cream most nights on clean, dry skin  -Continue BPO 2.5% gel mixed 1:1 with clindamycin 1% lotion QAM - she gets her BPO off [employer]  -can reconsider isotretinoin in future if needed. She does have clearance from PCP to restart isotretinoin despite having mood changes once while on isotretinoin for 2 weeks in the past.     Procedures Performed:    None    Follow-up: 1 year(s) in-person, or earlier for new or changing lesions    Staff and Scribe:     Scribe Disclosure:  GERALD, Suman Brownlee, am serving as a scribe to document services personally performed by Ryann Weber PA-C based on data collection and the provider's statements  to me.     Provider Disclosure:   The documentation recorded by the scribe accurately reflects the services I personally performed and the decisions made by me.    All risks, benefits and alternatives were discussed with patient.  Patient is in agreement and understands the assessment and plan.  All questions were answered.    Ryann Weber PA-C, MPAS  [facility]: Phone: [phone], Fax: [phone]  [facility]: Phone: [phone],  Fax: [phone]  ____________________________________________    CC: No chief complaint on file.    HPI:  Ms. Massiel Salvador is a(n) 21 year old female who presents today as a return patient for follow up. Patient was last seen by myself on 2/15/21 where she was continued on her current therapies for acne vulgaris.     Today, patient notes her skin is looking really well. She does not currently get breakouts. She has continued on spironolactone 50mg po BID, tretinoin 0.05% cream nightly, BPO 2.5% cream (off [employer]) mixed 1:1 with clindamycin 1% gel. Notes no side effects to the medications. Occasional dryness with the tretinoin and when this happens she skips a day. Denies menstrual irregularities, breast tenderness, or excessive urination.    Patient is otherwise feeling well, without additional skin concerns.    Labs Reviewed:  Last CMP was in 2020 and was nml    Physical Exam:  Vitals: There were no vitals taken for this visit.  SKIN: Teledermatology photos were reviewed; image quality and interpretability: acceptable. Image date: 1/20/22.  -face is clear - no active papules or pustules, no comedones  - No other lesions of concern on areas examined.     Medications:  Current Outpatient Medications   Medication     buPROPion (WELLBUTRIN XL) 150 MG 24 hr tablet     clindamycin (CLEOCIN T) 1 % external lotion     escitalopram (LEXAPRO) 5 MG tablet     spironolactone (ALDACTONE) 50 MG tablet      tretinoin (RETIN-A) 0.05 % external cream     No current facility-administered medications for this visit.      Past Medical/Surgical History:   Patient Active Problem List   Diagnosis     Neck pain     No past medical history on file.

## 2022-01-20 NOTE — PROGRESS NOTES
[facility] Dermatology Note  Encounter Date: Jan 20, 2022  Telephone ([phone]). Location of teledermatologist: [facility] [place]. Start time: 4:45 PM. End time: 4:55 PM.    Dermatology Problem List:  1. Acne Vulgaris   -Current tx: continue spironolactone 50mg po BID, tretinoin 0.05% most nights, clinda/BPO 2.5% gel QAM. We do have verbal clearance from her PCP to start isotretinoin in future if needed. Patient's anxiety is currently managed on Lexapro. She recently added Wellbutrin for tiredness, and reports things have improved on that front significantly.  -Previous tx: accutane 40 mg every day - discontinued after 2 weeks due to mood/anxiety changes, doxycycline 100mg po BID, adapalene 0.1% cream, sulfacetamide sodium 10% lotion  ____________________________________________    Assessment & Plan:     # Acne vulgaris. Clear. Stable on the following  -continue spironolactone 50mg po BID -CMP nml as of 11/23/20  -continue tretinoin 0.05% cream most nights on clean, dry skin  -Continue BPO 2.5% gel mixed 1:1 with clindamycin 1% lotion QAM - she gets her BPO off [employer]  -can reconsider isotretinoin in future if needed. She does have clearance from PCP to restart isotretinoin despite having mood changes once while on isotretinoin for 2 weeks in the past.     Procedures Performed:    None    Follow-up: 1 year(s) in-person, or earlier for new or changing lesions    Staff and Scribe:     Scribe Disclosure:  I, Suman Brownlee, am serving as a scribe to document services personally performed by Ryann Weber PA-C based on data collection and the provider's statements to me.     Provider Disclosure:   The documentation recorded by the scribe accurately reflects the services I personally performed and the decisions made by me.    All risks, benefits and alternatives were discussed with patient.  Patient is in agreement and understands the assessment and plan.  All questions  were answered.    Ryann Weber PA-C, MPAS  [facility] [place]: Phone: [phone], Fax: [phone]  [facility]: Phone: [phone],  Fax: [phone]  ____________________________________________    CC: No chief complaint on file.    HPI:  Ms. Massiel Salvador is a(n) 21 year old female who presents today as a return patient for follow up. Patient was last seen by myself on 2/15/21 where she was continued on her current therapies for acne vulgaris.     Today, patient notes her skin is looking really well. She does not currently get breakouts. She has continued on spironolactone 50mg po BID, tretinoin 0.05% cream nightly, BPO 2.5% cream (off [employer]) mixed 1:1 with clindamycin 1% gel. Notes no side effects to the medications. Occasional dryness with the tretinoin and when this happens she skips a day. Denies menstrual irregularities, breast tenderness, or excessive urination.    Patient is otherwise feeling well, without additional skin concerns.    Labs Reviewed:  Last CMP was in 2020 and was nml    Physical Exam:  Vitals: There were no vitals taken for this visit.  SKIN: Teledermatology photos were reviewed; image quality and interpretability: acceptable. Image date: 1/20/22.  -face is clear - no active papules or pustules, no comedones  - No other lesions of concern on areas examined.     Medications:  Current Outpatient Medications   Medication     buPROPion (WELLBUTRIN XL) 150 MG 24 hr tablet     clindamycin (CLEOCIN T) 1 % external lotion     escitalopram (LEXAPRO) 5 MG tablet     spironolactone (ALDACTONE) 50 MG tablet     tretinoin (RETIN-A) 0.05 % external cream     No current facility-administered medications for this visit.      Past Medical/Surgical History:   Patient Active Problem List   Diagnosis     Neck pain     No past medical history on file.

## 2022-01-20 NOTE — NURSING NOTE
Dermatology Rooming Note    Massiel Salvador's goals for this visit include:   Chief Complaint   Patient presents with     Acne     follow up, states that it has been doing really well     Hilaria aCrdenas CMA on 1/20/2022 at 4:37 PM

## 2022-01-22 PROCEDURE — 99283 EMERGENCY DEPT VISIT LOW MDM: CPT | Performed by: EMERGENCY MEDICINE

## 2022-01-23 ENCOUNTER — HOSPITAL ENCOUNTER (EMERGENCY)
Facility: CLINIC | Age: 22
Discharge: HOME | End: 2022-01-23
Attending: EMERGENCY MEDICINE | Admitting: EMERGENCY MEDICINE
Payer: COMMERCIAL

## 2022-01-23 VITALS
HEART RATE: 103 BPM | OXYGEN SATURATION: 95 % | SYSTOLIC BLOOD PRESSURE: 109 MMHG | DIASTOLIC BLOOD PRESSURE: 64 MMHG | TEMPERATURE: 98.2 F | RESPIRATION RATE: 16 BRPM

## 2022-01-23 DIAGNOSIS — H60.391 INFECTIVE OTITIS EXTERNA, RIGHT: ICD-10-CM

## 2022-01-23 DIAGNOSIS — H92.01 RIGHT EAR PAIN: ICD-10-CM

## 2022-01-23 RX ORDER — CIPROFLOXACIN AND DEXAMETHASONE 3; 1 MG/ML; MG/ML
4 SUSPENSION/ DROPS AURICULAR (OTIC) 2 TIMES DAILY
Qty: 20 ML | Refills: 0 | Status: SHIPPED | OUTPATIENT
Start: 2022-01-23

## 2022-01-23 RX ORDER — TRAMADOL HYDROCHLORIDE 50 MG/1
50 TABLET ORAL EVERY 6 HOURS PRN
Qty: 10 TABLET | Refills: 0 | Status: SHIPPED | OUTPATIENT
Start: 2022-01-23 | End: 2022-01-26

## 2022-01-23 ASSESSMENT — ENCOUNTER SYMPTOMS
SHORTNESS OF BREATH: 0
COUGH: 0
FEVER: 0

## 2022-01-23 NOTE — ED PROVIDER NOTES
[place] EMERGENCY DEPARTMENT ([facility])  1/23/22    History     Chief Complaint   Patient presents with     Otalgia     The history is provided by the patient and medical records.     Massiel Salvador is a 21 year old female who presents to the Emergency Department with right ear pain. Patient reports she was diagnosed with an ear infection at [facility] on Thursday, 1/20. She states Friday night she developed increased right ear pain. She states she was unable to sleep that night and was unable to do anything Saturday due to the pain. She called her mother who is a PA and her mother advised her to come in. Patient reports she started Augmentin on Friday. Patient notes she was also diagnosed with a sinus infection. She had a negative COVID test. She denies fevers, cough, or shortness of breath. Patient reports she tried ibuprofen, Tylenol, and delta 8 without relief. Patient denies chance of pregnancy.    Past Medical History  History reviewed. No pertinent past medical history.  History reviewed. No pertinent surgical history.  ciprofloxacin-dexamethasone (CIPRODEX) 0.3-0.1 % otic suspension  traMADol (ULTRAM) 50 MG tablet  buPROPion (WELLBUTRIN XL) 150 MG 24 hr tablet  clindamycin (CLEOCIN T) 1 % external lotion  escitalopram (LEXAPRO) 5 MG tablet  spironolactone (ALDACTONE) 50 MG tablet  tretinoin (RETIN-A) 0.05 % external cream      Allergies   Allergen Reactions     Shellfish-Derived Products      Family History  Family History   Problem Relation Age of Onset     Melanoma No family hx of      Skin Cancer No family hx of      Social History   Social History     Tobacco Use     Smoking status: Never Smoker     Smokeless tobacco: Never Used   Substance Use Topics     Alcohol use: Yes     Drug use: Yes     Types: Marijuana      Past medical history, past surgical history, medications, allergies, family history, and social history were reviewed with the patient. No additional pertinent items.       Review  of Systems   Constitutional: Negative for fever.   HENT: Positive for ear pain (right).    Respiratory: Negative for cough and shortness of breath.    All other systems reviewed and are negative.    A complete review of systems was performed with pertinent positives and negatives noted in the HPI, and all other systems negative.    Physical Exam   BP: 108/54  Pulse: 120  Temp: 98.2  F (36.8  C)  Resp: 16  SpO2: 97 %  Physical Exam  Constitutional:       General: She is not in acute distress.     Appearance: She is not ill-appearing, toxic-appearing or diaphoretic.   HENT:      Head: Normocephalic and atraumatic.      Right Ear: No decreased hearing noted. Drainage and tenderness present. No laceration. A middle ear effusion is present. There is no impacted cerumen. Tympanic membrane is erythematous. Tympanic membrane is not perforated.      Nose: Nose normal.   Eyes:      Conjunctiva/sclera: Conjunctivae normal.      Pupils: Pupils are equal, round, and reactive to light.   Cardiovascular:      Rate and Rhythm: Tachycardia present.      Pulses: Normal pulses.      Comments: -105  Pulmonary:      Effort: Pulmonary effort is normal. No respiratory distress.      Breath sounds: Normal breath sounds.   Neurological:      General: No focal deficit present.      Mental Status: She is oriented to person, place, and time.   Psychiatric:         Mood and Affect: Mood normal.         Behavior: Behavior normal.         Thought Content: Thought content normal.         Judgment: Judgment normal.      Comments: anxious         ED Course   12:41 AM  The patient was seen and examined by Brandee Cohen MD in Room ED29.      Procedures       The medical record was reviewed and interpreted.  Current labs reviewed and interpreted.  Previous labs reviewed and interpreted.         No results found for any visits on 01/23/22.  Medications - No data to display  No results found for any visits on 01/23/22.       No results found for  any visits on 01/23/22.  Medications - No data to display     Assessments & Plan (with Medical Decision Making)   Patient is a 28-year-old female who is otherwise healthy who presents to the ER complaining of right-sided ear pain.  Patient was started on antibiotics for a otitis media yesterday that she started taking yesterday.  Patient's been having ongoing pain so she was worried that she had a TM rupture.  Here on exam I see no signs of a TM rupture but her ear canal is swollen and she likely has element of otitis externa on this causing her increased pain.  Patient will be prescribed some Cipro otic drops to help with the swelling and the pain of her ear canal.  Patient recommended to continue taking the Augmentin.  The patient will be prescribed some tramadol to take for increased pain control.  Patient with no signs of malignant otitis externa.  Patient's vital signs are stable.  Patient will be discharged home.    I have reviewed the nursing notes. I have reviewed the findings, diagnosis, plan and need for follow up with the patient.    New Prescriptions    No medications on file       Final diagnoses:   Right ear pain   Infective otitis externa, right     ITeresa, am serving as a trained medical scribe to document services personally performed by Brandee Cohen MD, based on the provider's statements to me.      I, Brandee Cohen MD, was physically present and have reviewed and verified the accuracy of this note documented by Teresa Mcintosh.     --  Brandee Cohen MD  [facility] EMERGENCY DEPARTMENT  1/22/2022     Brandee Cohen MD  01/23/22 0143

## 2022-01-23 NOTE — DISCHARGE INSTRUCTIONS
Please use the ear drops as prescribed.      Continue taking the antibiotics as prescribed.     Take the pain medications as prescribed.    Please make an appointment to follow up with [facility] (phone: [phone]) in 3-5 days even if entirely better.

## 2022-01-23 NOTE — ED TRIAGE NOTES
Patient reports starting abx yesterday for ear infection (R). Reports her mother is a PA and concerned for ruptured ear drum. Abx Amox-Clav.

## 2022-01-29 ENCOUNTER — HEALTH MAINTENANCE LETTER (OUTPATIENT)
Age: 22
End: 2022-01-29

## 2022-02-28 ENCOUNTER — THERAPY VISIT (OUTPATIENT)
Dept: PHYSICAL THERAPY | Facility: CLINIC | Age: 22
End: 2022-02-28
Payer: COMMERCIAL

## 2022-02-28 DIAGNOSIS — M54.2 NECK PAIN: ICD-10-CM

## 2022-02-28 PROCEDURE — 97014 ELECTRIC STIMULATION THERAPY: CPT | Mod: GP | Performed by: PHYSICAL THERAPIST

## 2022-02-28 PROCEDURE — 20561 NDL INSJ W/O NJX 3+ MUSC: CPT | Performed by: PHYSICAL THERAPIST

## 2022-02-28 PROCEDURE — 97164 PT RE-EVAL EST PLAN CARE: CPT | Mod: GP | Performed by: PHYSICAL THERAPIST

## 2022-02-28 PROCEDURE — 97140 MANUAL THERAPY 1/> REGIONS: CPT | Mod: GP | Performed by: PHYSICAL THERAPIST

## 2022-02-28 NOTE — PROGRESS NOTES
PROGRESS  REPORT    Progress reporting period is from 10/15/21 to 2/28/22.       SUBJECTIVE  Subjective: Neck has started tightening up again.  Thinks it's related to stress and not doing enough of her PT exercises.  Getting ready to go out of [place] and wants to head it off before it gets bad like it used to be    Current Pain level: 0/10.     Initial Pain level: 5/10.   Changes in function:  Yes (See Goal flowsheet attached for changes in current functional level)  Adverse reaction to treatment or activity: None    OBJECTIVE  Objective: Cervical AROM: Flex: min loss, Ext: min loss, L SB: WNL, R SB: mod loss (+), L rot: WNL, R rot: mod loss (+).  R C3/4 hypomobility and adjacent soft tissue TTP     ASSESSMENT/PLAN  Updated problem list and treatment plan: Diagnosis 1:  Neck pain  Pain -  electric stimulation, manual therapy, self management, education, home program and dry needling  Decreased ROM/flexibility - manual therapy, therapeutic exercise and home program  Decreased joint mobility - manual therapy, therapeutic exercise and home program  STG/LTGs have been met or progress has been made towards goals:  Yes (See Goal flow sheet completed today.)  Assessment of Progress: The patient's condition has exacerbated.  Self Management Plans:  Patient has been instructed in a home treatment program.  I have re-evaluated this patient and find that the nature, scope, duration and intensity of the therapy is appropriate for the medical condition of the patient.  Massiel continues to require the following intervention to meet STG and LTG's:  PT    Recommendations:  This patient would benefit from continued therapy.     Frequency:  2 X a month, once daily  Duration:  for 1-2 months        Please refer to the daily flowsheet for treatment today, total treatment time and time spent performing 1:1 timed codes.

## 2022-03-22 ENCOUNTER — THERAPY VISIT (OUTPATIENT)
Dept: PHYSICAL THERAPY | Facility: CLINIC | Age: 22
End: 2022-03-22
Payer: COMMERCIAL

## 2022-03-22 DIAGNOSIS — M54.2 NECK PAIN: ICD-10-CM

## 2022-03-22 PROCEDURE — 97140 MANUAL THERAPY 1/> REGIONS: CPT | Mod: GP | Performed by: PHYSICAL THERAPIST

## 2022-03-22 PROCEDURE — 20561 NDL INSJ W/O NJX 3+ MUSC: CPT | Performed by: PHYSICAL THERAPIST

## 2022-03-22 PROCEDURE — 97014 ELECTRIC STIMULATION THERAPY: CPT | Mod: GP | Performed by: PHYSICAL THERAPIST

## 2022-03-22 NOTE — PROGRESS NOTES
SUBJECTIVE  Subjective: Felt much better after last treatment.  Has been on vacation and neck felt pretty good while traveling.  Minor tension on the R side like usual.  Has recently noticed there is some associated jaw pain and stiffness that she has had before but never mentioned to anyone   Current Pain level: 0/10   Changes in function:  Yes (See Goal flowsheet attached for changes in current functional level)     Adverse reaction to treatment or activity:  None    OBJECTIVE  Objective: Mild loss of R SB but otherwise normal ROM.  Post treatment R SB was normal.  Mouth opening: L deviation and click felt on 2 out of 5 reps.  Lateral deviation: no sxs to L but painful on the R.       ASSESSMENT  Massiel continues to require intervention to meet STG and LTG's: PT  Patient is progressing as expected.  Response to therapy has shown an improvement in  pain level and ROM   Progress made towards STG/LTG?  Yes (See Goal flowsheet attached for updates on achievement of STG and LTG)    PLAN  Current treatment program is being advanced to more complex exercises.  Due to new TMJ findings patient's next follow up will be with Ruthie Mak DPT     PTA/ATC plan:  N/A    Please refer to the daily flowsheet for treatment today, total treatment time and time spent performing 1:1 timed codes.

## 2022-09-18 ENCOUNTER — HEALTH MAINTENANCE LETTER (OUTPATIENT)
Age: 22
End: 2022-09-18

## 2022-09-29 DIAGNOSIS — L70.0 ACNE VULGARIS: ICD-10-CM

## 2022-10-04 RX ORDER — SPIRONOLACTONE 50 MG/1
TABLET, FILM COATED ORAL
Qty: 180 TABLET | Refills: 1 | OUTPATIENT
Start: 2022-10-04

## 2022-10-04 NOTE — TELEPHONE ENCOUNTER
SPIRONOLACTONE 50 MG TABLET TAKE 1 TABLET BY MOUTH TWICE A DAY  Last Written Prescription Date:  1/20/22  Last Fill Quantity: 180,   # refills: 3  Last Office Visit : 1/20/22  Future Office visit:  1 year       spironolactone  50 MG tablet 180 tablet 3RF> [id number] on 1/20/2022 - adequate to one year,Pharmacy notified.

## 2022-10-05 ENCOUNTER — TRANSCRIBE ORDERS (OUTPATIENT)
Dept: OTHER | Age: 22
End: 2022-10-05

## 2022-10-05 DIAGNOSIS — M54.2 CERVICALGIA: Primary | ICD-10-CM

## 2022-10-24 ENCOUNTER — THERAPY VISIT (OUTPATIENT)
Dept: PHYSICAL THERAPY | Facility: CLINIC | Age: 22
End: 2022-10-24
Payer: COMMERCIAL

## 2022-10-24 DIAGNOSIS — M54.2 NECK PAIN: Primary | ICD-10-CM

## 2022-10-24 PROCEDURE — 97014 ELECTRIC STIMULATION THERAPY: CPT | Mod: GP | Performed by: PHYSICAL THERAPIST

## 2022-10-24 PROCEDURE — 97161 PT EVAL LOW COMPLEX 20 MIN: CPT | Mod: GP | Performed by: PHYSICAL THERAPIST

## 2022-10-24 PROCEDURE — 20561 NDL INSJ W/O NJX 3+ MUSC: CPT | Performed by: PHYSICAL THERAPIST

## 2022-10-24 NOTE — PROGRESS NOTES
Physical Therapy Initial Evaluation  Subjective:  The history is provided by the patient. No  was used.   Patient Health History  Massiel Salvador being seen for Neck pain.     Problem began: 2/1/2020.   Problem occurred: posture   Pain is reported as 6/10 on pain scale.  General health as reported by patient is good.  Pertinent medical history includes: depression.   Red flags:  None as reported by patient.  Medical allergies: none.   Surgeries include:  Other. Other surgery history details: rhinolasty.    Current medications:  Anti-depressants, muscle relaxants and pain medication.    Current occupation .   Primary job tasks include:  Computer work.                  Therapist Generated HPI Evaluation  Problem details: Pt reports some of her neck pain has come back.  It feels the same as it did last last year into early this year.  PT was extremely helpful then and wants to get on top of this before it gets bad again.         Type of problem:  Cervical spine.    This is a recurrent condition.  Condition occurred with:  Repetition/overuse.  Where condition occurred: at home and at work.  Patient reports pain:  Upper cervical spine and cervical right side.  Pain is described as aching and is intermittent.  Pain radiates to:  Head. Pain is the same all the time.  Since onset symptoms are gradually worsening.  Associated symptoms:  Loss of motion/stiffness and headache. Symptoms are exacerbated by rotating head  Relieved by: exercises.      Restrictions due to condition include:  Working in normal job without restrictions.  Barriers include:  None as reported by patient.                        Objective:  Standing Alignment:    Cervical/Thoracic:  Thoracic kyphosis increased                                    Cervical/Thoracic Evaluation    AROM:  AROM Cervical:    Flexion:            WNL (-)  Extension:       WNL (-)  Rotation:         Left: WNL (-)     Right: WNL (+)  Side Bend:      Left:  min loss (+)     Right:  WNL (-)      Headaches: cervical  Cervical Myotomes:  normal                        Cervical Palpation:      Tenderness present at Right:    Upper Trap      Spinal Segmental Conclusions:  Hypo L C3/4 and R C1/2                                                  General     ROS    Assessment/Plan:    Patient is a 21 year old female with cervical complaints.    Patient has the following significant findings with corresponding treatment plan.                Diagnosis 1:  Neck pain  Pain -  electric stimulation, manual therapy, dry needling, self management, education and home program  Decreased ROM/flexibility - manual therapy, therapeutic exercise and home program  Decreased joint mobility - manual therapy, therapeutic exercise and home program  Impaired posture - neuro re-education and home program    Cumulative Therapy Evaluation is: Low complexity.    Previous and current functional limitations:  (See Goal Flow Sheet for this information)    Short term and Long term goals: (See Goal Flow Sheet for this information)     Communication ability:  Patient appears to be able to clearly communicate and understand verbal and written communication and follow directions correctly.  Treatment Explanation - The following has been discussed with the patient:   RX ordered/plan of care  Anticipated outcomes  Possible risks and side effects  This patient would benefit from PT intervention to resume normal activities.   Rehab potential is good.    Frequency:  1 X week, once daily  Duration:  for 6 weeks  Discharge Plan:  Achieve all LTG.  Independent in home treatment program.  Reach maximal therapeutic benefit.    Please refer to the daily flowsheet for treatment today, total treatment time and time spent performing 1:1 timed codes.

## 2022-10-31 ENCOUNTER — THERAPY VISIT (OUTPATIENT)
Dept: PHYSICAL THERAPY | Facility: CLINIC | Age: 22
End: 2022-10-31
Payer: COMMERCIAL

## 2022-10-31 DIAGNOSIS — M54.2 NECK PAIN: Primary | ICD-10-CM

## 2022-10-31 PROCEDURE — 97140 MANUAL THERAPY 1/> REGIONS: CPT | Mod: GP | Performed by: PHYSICAL THERAPIST

## 2022-10-31 PROCEDURE — 97014 ELECTRIC STIMULATION THERAPY: CPT | Mod: GP | Performed by: PHYSICAL THERAPIST

## 2022-10-31 PROCEDURE — 20561 NDL INSJ W/O NJX 3+ MUSC: CPT | Performed by: PHYSICAL THERAPIST

## 2022-11-07 ENCOUNTER — THERAPY VISIT (OUTPATIENT)
Dept: PHYSICAL THERAPY | Facility: CLINIC | Age: 22
End: 2022-11-07
Payer: COMMERCIAL

## 2022-11-07 DIAGNOSIS — M54.2 NECK PAIN: Primary | ICD-10-CM

## 2022-11-07 PROCEDURE — 97140 MANUAL THERAPY 1/> REGIONS: CPT | Mod: GP | Performed by: PHYSICAL THERAPIST

## 2022-11-07 PROCEDURE — 97014 ELECTRIC STIMULATION THERAPY: CPT | Mod: GP | Performed by: PHYSICAL THERAPIST

## 2022-11-07 PROCEDURE — 20561 NDL INSJ W/O NJX 3+ MUSC: CPT | Performed by: PHYSICAL THERAPIST

## 2022-11-07 NOTE — PROGRESS NOTES
SUBJECTIVE  Subjective: Almost no pain in the neck since last session.  There is a spot by the R shoulder blade that hurts and not sure if it is related or not   Current Pain level: 1/10   Changes in function:  Yes (See Goal flowsheet attached for changes in current functional level)     Adverse reaction to treatment or activity:  None    OBJECTIVE  Objective: R rib 3 hypomobile and painful to PA springing.  More tension in the neck than specifically in the belly of the R UT today     ASSESSMENT  Massiel continues to require intervention to meet STG and LTG's: PT  Patient is progressing as expected.  Response to therapy has shown an improvement in  pain level  Progress made towards STG/LTG?  Yes (See Goal flowsheet attached for updates on achievement of STG and LTG)    PLAN  Consider decreasing frequency next visit.    PTA/ATC plan:  N/A    Please refer to the daily flowsheet for treatment today, total treatment time and time spent performing 1:1 timed codes.

## 2022-12-09 ENCOUNTER — THERAPY VISIT (OUTPATIENT)
Dept: PHYSICAL THERAPY | Facility: CLINIC | Age: 22
End: 2022-12-09
Payer: COMMERCIAL

## 2022-12-09 DIAGNOSIS — M54.2 NECK PAIN: Primary | ICD-10-CM

## 2022-12-09 PROCEDURE — 97014 ELECTRIC STIMULATION THERAPY: CPT | Mod: GP | Performed by: PHYSICAL THERAPIST

## 2022-12-09 PROCEDURE — 97140 MANUAL THERAPY 1/> REGIONS: CPT | Mod: GP | Performed by: PHYSICAL THERAPIST

## 2022-12-09 PROCEDURE — 20561 NDL INSJ W/O NJX 3+ MUSC: CPT | Performed by: PHYSICAL THERAPIST

## 2022-12-09 NOTE — PROGRESS NOTES
SUBJECTIVE  Subjective: Overall doing very well.  Although oddly the L feels tighter than the R so wondering if we could look at that more today   Current Pain level: 1/10   Changes in function:  Yes (See Goal flowsheet attached for changes in current functional level)     Adverse reaction to treatment or activity:  None    OBJECTIVE  Objective: This week the L 3rd rib was painful to springing.  Responded very well to manipulation again.  R UT TrP is lessening     ASSESSMENT  Massiel continues to require intervention to meet STG and LTG's: PT  Patient is progressing as expected.  Response to therapy has shown an improvement in  pain level  Progress made towards STG/LTG?  Yes (See Goal flowsheet attached for updates on achievement of STG and LTG)    PLAN  Continue current treatment plan until patient demonstrates readiness to progress to higher level exercises.    PTA/ATC plan:  N/A    Please refer to the daily flowsheet for treatment today, total treatment time and time spent performing 1:1 timed codes.

## 2023-01-06 ENCOUNTER — THERAPY VISIT (OUTPATIENT)
Dept: PHYSICAL THERAPY | Facility: CLINIC | Age: 23
End: 2023-01-06
Payer: COMMERCIAL

## 2023-01-06 DIAGNOSIS — M54.2 NECK PAIN: Primary | ICD-10-CM

## 2023-01-06 PROCEDURE — 97140 MANUAL THERAPY 1/> REGIONS: CPT | Mod: GP | Performed by: PHYSICAL THERAPIST

## 2023-01-06 PROCEDURE — 20561 NDL INSJ W/O NJX 3+ MUSC: CPT | Performed by: PHYSICAL THERAPIST

## 2023-01-06 PROCEDURE — 97014 ELECTRIC STIMULATION THERAPY: CPT | Mod: GP | Performed by: PHYSICAL THERAPIST

## 2023-01-06 NOTE — PROGRESS NOTES
DISCHARGE REPORT    Progress reporting period is from 10/24/22 to 1/6/23.       SUBJECTIVE  Subjective: The neck has been feeling really good for the last month. Woke up this morning with some R sided soreness but really doing well otherwise    Current Pain level: 1/10.     Initial Pain level: 4/10.   Changes in function:  Yes (See Goal flowsheet attached for changes in current functional level)  Adverse reaction to treatment or activity: None    OBJECTIVE  Objective: Some TTP and hypmobility at R C3/4 facet that caused a limitation in R SBing.  It resolved with manual therapy and all other motions were WNLs     ASSESSMENT/PLAN  Updated problem list and treatment plan: Diagnosis 1:  Neck pain    STG/LTGs have been met or progress has been made towards goals:  Yes (See Goal flow sheet completed today.)  Assessment of Progress: The patient's condition is improving.  The patient has met all of their long term goals.  Self Management Plans:  Patient is independent in a home treatment program.  Massiel continues to require the following intervention to meet STG and LTG's:  PT intervention is no longer required to meet STG/LTG.    Recommendations:  This patient is ready to be discharged from therapy and continue their home treatment program.    Please refer to the daily flowsheet for treatment today, total treatment time and time spent performing 1:1 timed codes.

## 2023-05-07 ENCOUNTER — HEALTH MAINTENANCE LETTER (OUTPATIENT)
Age: 23
End: 2023-05-07

## 2024-07-14 ENCOUNTER — HEALTH MAINTENANCE LETTER (OUTPATIENT)
Age: 24
End: 2024-07-14

## 2025-07-19 ENCOUNTER — HEALTH MAINTENANCE LETTER (OUTPATIENT)
Age: 25
End: 2025-07-19